# Patient Record
Sex: FEMALE | HISPANIC OR LATINO | Employment: OTHER | URBAN - METROPOLITAN AREA
[De-identification: names, ages, dates, MRNs, and addresses within clinical notes are randomized per-mention and may not be internally consistent; named-entity substitution may affect disease eponyms.]

---

## 2023-04-27 ENCOUNTER — LAB REQUISITION (OUTPATIENT)
Dept: LAB | Facility: CLINIC | Age: 68
End: 2023-04-27
Payer: MEDICAID

## 2023-04-27 DIAGNOSIS — I25.10 ATHEROSCLEROTIC HEART DISEASE OF NATIVE CORONARY ARTERY WITHOUT ANGINA PECTORIS: ICD-10-CM

## 2023-04-27 PROCEDURE — 80061 LIPID PANEL: CPT | Performed by: INTERNAL MEDICINE

## 2023-04-28 LAB
CHOLEST SERPL-MCNC: 153 MG/DL
HDLC SERPL-MCNC: 43 MG/DL
LDLC SERPL CALC-MCNC: 66 MG/DL
NONHDLC SERPL-MCNC: 110 MG/DL
TRIGL SERPL-MCNC: 218 MG/DL

## 2023-05-23 ENCOUNTER — MEDICAL CORRESPONDENCE (OUTPATIENT)
Dept: HEALTH INFORMATION MANAGEMENT | Facility: CLINIC | Age: 68
End: 2023-05-23

## 2023-05-24 ENCOUNTER — TRANSCRIBE ORDERS (OUTPATIENT)
Dept: OTHER | Age: 68
End: 2023-05-24

## 2023-05-24 DIAGNOSIS — G89.29 BILATERAL CHRONIC KNEE PAIN: Primary | ICD-10-CM

## 2023-05-24 DIAGNOSIS — M25.561 BILATERAL CHRONIC KNEE PAIN: Primary | ICD-10-CM

## 2023-05-24 DIAGNOSIS — M25.562 BILATERAL CHRONIC KNEE PAIN: Primary | ICD-10-CM

## 2023-05-25 ENCOUNTER — TRANSCRIBE ORDERS (OUTPATIENT)
Dept: OTHER | Age: 68
End: 2023-05-25

## 2023-05-25 DIAGNOSIS — I25.10 CORONARY ARTERY DISEASE: Primary | ICD-10-CM

## 2023-07-06 NOTE — TELEPHONE ENCOUNTER
DIAGNOSIS: B\L knee pain, had B\L TKA over 10 years ago\ patient went to see Sasha Henry / [facility]\   APPOINTMENT DATE: 7/7/23   NOTES STATUS DETAILS   OFFICE NOTE from referring provider In process    OFFICE NOTE from other specialist In process    OPERATIVE REPORT In process    MEDICATION LIST In process    MRI In process    XRAYS (IMAGES & REPORTS) In process        Records Requested     July 6, 2023 5:16 PM  [place] [address]   Facility  [facility]  Fax: [phone]  [facility]  Fax: [phone]   Outcome CSS faxed urgent request to [facility] for records.     UPDATE 7/7/23 9:08AM - [facility] called [facility] for records. Med Recs stated they will fax records over ASAP.

## 2023-07-07 ENCOUNTER — PRE VISIT (OUTPATIENT)
Dept: ORTHOPEDICS | Facility: CLINIC | Age: 68
End: 2023-07-07

## 2023-07-07 ENCOUNTER — ANCILLARY PROCEDURE (OUTPATIENT)
Dept: GENERAL RADIOLOGY | Facility: CLINIC | Age: 68
End: 2023-07-07
Attending: FAMILY MEDICINE
Payer: COMMERCIAL

## 2023-07-07 ENCOUNTER — OFFICE VISIT (OUTPATIENT)
Dept: ORTHOPEDICS | Facility: CLINIC | Age: 68
End: 2023-07-07
Payer: COMMERCIAL

## 2023-07-07 VITALS — BODY MASS INDEX: 45.53 KG/M2 | HEIGHT: 60 IN | WEIGHT: 231.9 LBS

## 2023-07-07 DIAGNOSIS — M25.551 RIGHT HIP PAIN: ICD-10-CM

## 2023-07-07 DIAGNOSIS — E66.01 MORBID OBESITY (H): ICD-10-CM

## 2023-07-07 DIAGNOSIS — M25.561 BILATERAL KNEE PAIN: ICD-10-CM

## 2023-07-07 DIAGNOSIS — Z96.653 S/P TOTAL KNEE ARTHROPLASTY, BILATERAL: Primary | ICD-10-CM

## 2023-07-07 DIAGNOSIS — M25.562 BILATERAL KNEE PAIN: ICD-10-CM

## 2023-07-07 DIAGNOSIS — G89.29 CHRONIC PAIN OF BOTH KNEES: ICD-10-CM

## 2023-07-07 DIAGNOSIS — M25.561 CHRONIC PAIN OF BOTH KNEES: ICD-10-CM

## 2023-07-07 DIAGNOSIS — M25.562 CHRONIC PAIN OF BOTH KNEES: ICD-10-CM

## 2023-07-07 PROCEDURE — 73562 X-RAY EXAM OF KNEE 3: CPT | Mod: LT | Performed by: RADIOLOGY

## 2023-07-07 PROCEDURE — 99204 OFFICE O/P NEW MOD 45 MIN: CPT | Performed by: FAMILY MEDICINE

## 2023-07-07 PROCEDURE — 73502 X-RAY EXAM HIP UNI 2-3 VIEWS: CPT | Mod: RT | Performed by: RADIOLOGY

## 2023-07-07 RX ORDER — IRBESARTAN 150 MG/1
150 TABLET ORAL AT BEDTIME
COMMUNITY

## 2023-07-07 RX ORDER — ASPIRIN 81 MG/1
81 TABLET ORAL DAILY
COMMUNITY

## 2023-07-07 RX ORDER — CLOPIDOGREL BISULFATE 75 MG/1
75 TABLET ORAL DAILY
COMMUNITY

## 2023-07-07 RX ORDER — ATORVASTATIN CALCIUM 10 MG/1
10 TABLET, FILM COATED ORAL DAILY
COMMUNITY

## 2023-07-07 NOTE — LETTER
7/7/2023      RE: Mila Suarez  [address]  [place] [id number]     Dear Colleague,    Thank you for referring your patient, Mila Suarez, to the [facility] [place]. Please see a copy of my visit note below.    ASSESSMENT/PLAN:    69 yo female with PMhx of bilateral knee TKAs in [facility], obesity presenting for bilateral knee pain and right hip pain  1.  Bilateral knee TKA- valgus alignment, alignment is wrong  Components might be different or completely worn  Weight might be contributer  Left is worse than right  Discussed with Radiology 5 min  2.  Right hip pain- OA greater on right  Pool therapy  OTC meds  Continue to use her walker    RTC to see TKA surgeon  No outside records from past surgeries are available    Pt is a 68 year old female here today for:     S/p bilateral knee TKAs in [facility] about 15 years ago  No past surgical information available, here on refugee status  Pt reports medial and lateral knee pain.  American surgeon that came down, one surgeon only did 100 surgeries before her operation.  Low back pain when she tries to get up from laying down  Right groin/pelvic pain  Always using her walker to help  Just a cane in [facility], here for just 3 months    HPI:   bilateral knee:  Location: anterolateral knee   Duration: 10 years   Trauma/ Fall? No   Able to walk? Yes  Swelling? None   Bruising? None  Numbness/ Tingling? None   Weakness? None   Instability? None  Snapping/Clicking? None  Imaging? XR today   Treatment? None     EXAM:   bilateral Knee:   ROM: 0-130; Crepitus: none  Effusion: mild ; Swelling: swelling   Strength: Full in flexion/ extension   Tenderness: Patella - mild Medial joint line - +++; Lateral joint line - +++; Quad tendon - none; Patellar tendon- none; Hamstring - none.   Cruciates: anterior drawer - not performed  /posterior drawer -not performed. Lachman - not performed    Collaterals: varus -neg/valgus -neg.   Patella: patellar compression - neg; single leg bend- unable  Meniscus: Timmy - painful; Thessaly - not performed  Maneuvers: Yumiko - not performed      No past medical history on file.   No past surgical history on file.   No current outpatient medications on file.      Not on File   ROS:   Gen- no fevers/chills   Rheum - no morning stiffness   Derm - no rash/ redness   Neuro - no numbness, no tingling   Remainder of ROS negative.     Exam:   There were no vitals taken for this visit.     Xray of bilateral knees, right hip/pelvis on July 7, 2023 at [facility] location - films personally reviewed with patient at time of visit     My impression: bilateral knee TKA- components are an issue, right hip OA         Again, thank you for allowing me to participate in the care of your patient.      Sincerely,    Kenna Pearce MD

## 2023-07-07 NOTE — PROGRESS NOTES
ASSESSMENT/PLAN:    67 yo female with PMhx of bilateral knee TKAs in [facility], obesity presenting for bilateral knee pain and right hip pain  1.  Bilateral knee TKA- valgus alignment, alignment is wrong  Components might be different or completely worn  Weight might be contributer  Left is worse than right  Discussed with Radiology 5 min  2.  Right hip pain- OA greater on right  Pool therapy  OTC meds  Continue to use her walker    RTC to see TKA surgeon  No outside records from past surgeries are available    Pt is a 68 year old female here today for:     S/p bilateral knee TKAs in [facility] about 15 years ago  No past surgical information available, here on refugee status  Pt reports medial and lateral knee pain.  American surgeon that came down, one surgeon only did 100 surgeries before her operation.  Low back pain when she tries to get up from laying down  Right groin/pelvic pain  Always using her walker to help  Just a cane in [facility], here for just 3 months    HPI:   bilateral knee:  Location: anterolateral knee   Duration: 10 years   Trauma/ Fall? No   Able to walk? Yes  Swelling? None   Bruising? None  Numbness/ Tingling? None   Weakness? None   Instability? None  Snapping/Clicking? None  Imaging? XR today   Treatment? None     EXAM:   bilateral Knee:   ROM: 0-130; Crepitus: none  Effusion: mild ; Swelling: swelling   Strength: Full in flexion/ extension   Tenderness: Patella - mild Medial joint line - +++; Lateral joint line - +++; Quad tendon - none; Patellar tendon- none; Hamstring - none.   Cruciates: anterior drawer - not performed  /posterior drawer -not performed. Lachman - not performed   Collaterals: varus -neg/valgus -neg.   Patella: patellar compression - neg; single leg bend- unable  Meniscus: Timmy - painful; Thessaly - not performed  Maneuvers: Yumiko - not performed      No past medical history on file.   No past surgical history on file.   No current outpatient medications on file.       Not on File   ROS:   Gen- no fevers/chills   Rheum - no morning stiffness   Derm - no rash/ redness   Neuro - no numbness, no tingling   Remainder of ROS negative.     Exam:   There were no vitals taken for this visit.     Xray of bilateral knees, right hip/pelvis on July 7, 2023 at [facility] location - films personally reviewed with patient at time of visit     My impression: bilateral knee TKA- components are an issue, right hip OA

## 2023-07-07 NOTE — TELEPHONE ENCOUNTER
DIAGNOSIS: B\L knee pain, had B\L TKA over 10 years ago\ patient went to see Sasha Henry / [facility]\   APPOINTMENT DATE: 7/7/23   NOTES STATUS DETAILS   OFFICE NOTE from referring provider Care Everywhere 6/27/23, 5/23/23, 5/16/23 - Kathie Henry NP - [facility] - BL knee pain    OFFICE NOTE from other specialist In process/ Care Everywhere 4/27/23 - Denise Naik MD - [facility]    OPERATIVE REPORT In process [facility]?   MEDICATION LIST Care Everywhere        Records Requested     July 6, 2023 5:16 PM  [id number]   Facility  [facility]  Fax: [phone]  [facility]  Fax: [phone]   Outcome CSS faxed urgent request to [facility] for records.     UPDATE 7/7/23 9:08AM - [facility] called [facility] for records. Med Recs stated they will fax records over ASA.     RECORDS RECEIVED: Patient was scheduled incorrectly, new chart was made and pt name was spelled wrong. [facility] had CC reschedule appt to correct patient chart and guillermina for merge. Clinic updated.     [facility] gave ERICH to Christof[facility] for outside records from [facility]

## 2023-07-14 ENCOUNTER — HOSPITAL ENCOUNTER (OUTPATIENT)
Dept: CARDIOLOGY | Facility: CLINIC | Age: 68
Discharge: HOME | End: 2023-07-14
Payer: COMMERCIAL

## 2023-07-14 DIAGNOSIS — I25.10 CORONARY ARTERY DISEASE: ICD-10-CM

## 2023-07-14 LAB — LVEF ECHO: NORMAL

## 2023-07-14 PROCEDURE — 93306 TTE W/DOPPLER COMPLETE: CPT | Mod: 26 | Performed by: INTERNAL MEDICINE

## 2023-07-14 PROCEDURE — 255N000002 HC RX 255 OP 636: Performed by: INTERNAL MEDICINE

## 2023-07-14 RX ADMIN — HUMAN ALBUMIN MICROSPHERES AND PERFLUTREN 5 ML: 10; .22 INJECTION, SOLUTION INTRAVENOUS at 14:17

## 2023-08-04 NOTE — TELEPHONE ENCOUNTER
DIAGNOSIS: Bilateral knee TKA in [facility] 15 years ago, increasing pain over the last 10 years / XR / Kenna Pearce MD / [facility]    APPOINTMENT DATE: 08/09/23   NOTES STATUS DETAILS   OFFICE NOTE from referring provider Internal/Care Everywhere 07/07/23- Kenna Pearce MD    6/27/23, 5/23/23, 5/16/23 - Kathie Henry NP - [facility] -  knee pain    OFFICE NOTE from other specialist Care Everywhere 4/27/23 - Denise Naik MD - [facility]     MEDICATION LIST Internal    XRAYS (IMAGES & REPORTS) Internal 07/07/23- Kenna Pearce MD:  - XR NANCY Knee

## 2023-08-09 ENCOUNTER — PRE VISIT (OUTPATIENT)
Dept: ORTHOPEDICS | Facility: CLINIC | Age: 68
End: 2023-08-09

## 2023-08-25 ENCOUNTER — MEDICAL CORRESPONDENCE (OUTPATIENT)
Dept: HEALTH INFORMATION MANAGEMENT | Facility: CLINIC | Age: 68
End: 2023-08-25
Payer: COMMERCIAL

## 2023-08-28 ENCOUNTER — TRANSCRIBE ORDERS (OUTPATIENT)
Dept: OTHER | Age: 68
End: 2023-08-28

## 2023-08-28 DIAGNOSIS — I10 PRIMARY HYPERTENSION: Primary | ICD-10-CM

## 2023-09-01 NOTE — TELEPHONE ENCOUNTER
DIAGNOSIS: Bilateral knee TKA in [facility] 15 years ago, increasing pain over the last 10 years    avail over the phone at [phone] option 1 or via iPad.    APPOINTMENT DATE: 09/07/2023   NOTES STATUS DETAILS   OFFICE NOTE from referring provider Internal 07/07/2023 - Kenna Pearce MD - [facility]   OPERATIVE REPORT N/A    MEDICATION LIST Internal    XRAYS (IMAGES & REPORTS) Internal 07/07/2023 - Hip/Pelvis  07/07/2023 - Bilateral knee

## 2023-09-07 ENCOUNTER — PRE VISIT (OUTPATIENT)
Dept: ORTHOPEDICS | Facility: CLINIC | Age: 68
End: 2023-09-07

## 2023-09-07 ENCOUNTER — OFFICE VISIT (OUTPATIENT)
Dept: ORTHOPEDICS | Facility: CLINIC | Age: 68
End: 2023-09-07
Attending: FAMILY MEDICINE
Payer: COMMERCIAL

## 2023-09-07 ENCOUNTER — ANCILLARY PROCEDURE (OUTPATIENT)
Dept: GENERAL RADIOLOGY | Facility: CLINIC | Age: 68
End: 2023-09-07
Attending: ORTHOPAEDIC SURGERY
Payer: COMMERCIAL

## 2023-09-07 VITALS — HEIGHT: 59 IN | WEIGHT: 231 LBS | BODY MASS INDEX: 46.57 KG/M2

## 2023-09-07 DIAGNOSIS — M25.561 CHRONIC PAIN OF BOTH KNEES: ICD-10-CM

## 2023-09-07 DIAGNOSIS — G89.29 CHRONIC PAIN OF BOTH KNEES: ICD-10-CM

## 2023-09-07 DIAGNOSIS — Z96.653 S/P TOTAL KNEE ARTHROPLASTY, BILATERAL: ICD-10-CM

## 2023-09-07 DIAGNOSIS — M25.562 CHRONIC PAIN OF BOTH KNEES: ICD-10-CM

## 2023-09-07 PROCEDURE — 77073 BONE LENGTH STUDIES: CPT | Performed by: STUDENT IN AN ORGANIZED HEALTH CARE EDUCATION/TRAINING PROGRAM

## 2023-09-07 PROCEDURE — 99204 OFFICE O/P NEW MOD 45 MIN: CPT | Mod: GC | Performed by: ORTHOPAEDIC SURGERY

## 2023-09-07 NOTE — LETTER
9/7/2023         RE: Mila Aguirre  [address]  [place] [phone]        Dear Colleague,    Thank you for referring your patient, Mila Aguirre, to the [facility] [place]. Please see a copy of my visit note below.    I have personally examined this patient and have reviewed the clinical presentation and progress note with the resident. I agree with the treatment plan as outlined. The plan was formulated with the resident on the day of the resident's dictation.  Implants are Sharron and discussed the poly options with the rep today       Assessment: This is a 68 year old with a history of bilateral total knee arthroplasties performed in [facility] approximately 15 years ago, now presenting with left greater than right bilateral knee pain, with radiographic evidence of medial sided wear of the polyethylene insert bilaterally.  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    Plan:    Discussed the concerns of the alignment of the knee arthroplasty as well as a limb with the patient, as well as the pain she is experiencing.  Discussed this will likely require a revision surgery of her left total knee arthroplasty, however prior to planning for the revision, we will obtain a CT of the left lower extremity to evaluate component fixation, as well as overall alignment.  The patient will follow-up for surgical discussion after the CT is complete.    Patient seen and discussed with Dr. Burkett.      Chief Complaint: Consult (Bilateral TKA in [facility] 15 years ago. Pain increasing over the past ten years // )      Physician:  Kenna Alas*    A Welsh video  was utilized for this visit.     HPI: Mila Aguirre is a 68 year old female who presents today for evaluation of bilateral knee pain, left worse than right.  She reports that she had her knee replaced since done in   [facility] approximately 15 years ago.  She felt that she was doing well for a few years postoperatively, however began to have bilateral knee pain, starting with the left and then progressing to the right approximately 6 years after her knee replacements.  Additionally at this time she started to notice a difference in her gait pattern on her left side.  Unfortunately she has also noticed instability in her left knee.  Her knee pain has progressed to the point that it is difficult for her to do stairs or any prolonged walking or activities.  She reports that she mostly stays at home at this time in order to help alleviate her pain.  She periodically takes over-the-counter pain medications.  She denies a history of infections or other issues with her knees.  She had no further surgeries performed on her knees.    She recently moved to [place] from [facility], within the last 4 months.  She did see a primary care provider approximately 3 months ago.    FANNY Santa  PROMIS Mental: (P) 13  PROMIS Physical (P) 11  PROMIS TOTAL (P) 24  [facility]:    MEDICAL HISTORY: No past medical history on file.    Medications:     Current Outpatient Medications:     aspirin 81 MG EC tablet, Take 81 mg by mouth daily, Disp: , Rfl:     atorvastatin (LIPITOR) 10 MG tablet, Take 10 mg by mouth daily, Disp: , Rfl:     clopidogrel (PLAVIX) 75 MG tablet, Take 75 mg by mouth daily, Disp: , Rfl:     irbesartan (AVAPRO) 150 MG tablet, Take 150 mg by mouth At Bedtime, Disp: , Rfl:     Allergies: Patient has no known allergies.    SURGICAL HISTORY: No past surgical history on file.    FAMILY HISTORY: No family history on file.    SOCIAL HISTORY:   Social History     Tobacco Use    Smoking status: Not on file    Smokeless tobacco: Not on file   Substance Use Topics    Alcohol use: Not on file       REVIEW OF SYSTEMS:  The comprehensive review of systems from the intake form was reviewed with the patient.  No fever, weight change or fatigue. No dry eyes.  "No oral ulcers, sore throat or voice change. No palpitations, syncope, angina or edema.  No chest pain, excessive sleepiness, shortness of breath or hemoptysis.   No abdominal pain, nausea, vomiting, diarrhea or heartburn.  No skin rash. No focal weakness or numbness. No bleeding or lymphadenopathy. No rhinitis or hives.     Exam:  On physical examination the patient appears the stated age, is in no acute distress, affect is appropriate, and breathing is non-labored.  Vitals are documented in the EMR and have been reviewed:    Ht 1.511 m (4' 11.49\")   Wt 104.8 kg (231 lb)   BMI 45.89 kg/m    4' 11.488\"  Body mass index is 45.89 kg/m .    Rises from chair: Independently though slowly and cautiously  Gait: Valgus thrust gait on the left  Gains the exam table: Did not perform    Bilateral lower extremity with chronic venous stasis changes.     Right  Knee  Appearance: Well-healed surgical incision  Clinical alignment: Varus alignment, corrects to neutral  Effusion: moderate effusion   Tenderness to palpation: Minimally ttp about the join  Extension: Full extension   Flexion: 120 deg   Collateral ligaments: intact collateral ligaments with firm end point on varus stress   Cruciate ligaments: grossly intact     Left Knee  Appearance: Well-healed surgical incision  Clinical alignment: Varus alignment, corrects to neutral  Effusion: moderate effusion   Tenderness to palpation: nontender to palpation   Extension: Full extension   Flexion: 120 deg   Collateral ligaments: Laxity with varus stress without firm end point, concerning for LCL laxity/injury   Cruciate ligaments: grossly intact     Distally, the circulatory, motor, and sensation exam is intact with 5/5 EHL, gastroc-soleus, and tibialis anterior.    Sensation to light touch is intact.    Dorsalis pedis and posterior tibialis pulses are palpable.    There are no sores on the feet, no bruising, and no lymphedema.    X-rays:   Bilateral knee radiographs and 6 foot " standing radiographs reviewed. Left knee demonstrates significant medial-sided poly wear, components appear well fixed. Right knee with medial sided wear of the polyethylene component as well, though again components remained well fixed. Limb alignment radiographs demonstrate a varus alignment.       Sincerely,        J Carlos Burkett MD

## 2023-09-07 NOTE — NURSING NOTE
"Reason For Visit:   Chief Complaint   Patient presents with    Consult     Bilateral TKA in [facility] 15 years ago. Pain increasing over the past ten years //        Ht 1.511 m (4' 11.49\")   Wt 104.8 kg (231 lb)   BMI 45.89 kg/m      Pain Assessment  Patient Currently in Pain: Yes  0-10 Pain Scale: 8 (pain with walking L>R pain and inflamation)    Lauri Roberts, EMT    "

## 2023-09-07 NOTE — PROGRESS NOTES
Assessment: This is a 68 year old with a history of bilateral total knee arthroplasties performed in [facility] approximately 15 years ago, now presenting with left greater than right bilateral knee pain, with radiographic evidence of medial sided wear of the polyethylene insert bilaterally.  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    Plan:    Discussed the concerns of the alignment of the knee arthroplasty as well as a limb with the patient, as well as the pain she is experiencing.  Discussed this will likely require a revision surgery of her left total knee arthroplasty, however prior to planning for the revision, we will obtain a CT of the left lower extremity to evaluate component fixation, as well as overall alignment.  The patient will follow-up for surgical discussion after the CT is complete.    Patient seen and discussed with Dr. Burkett.      Chief Complaint: Consult (Bilateral TKA in [facility] 15 years ago. Pain increasing over the past ten years // )      Physician:  Kenna Alas*    A Irish video  was utilized for this visit.     HPI: Mila Aguirre is a 68 year old female who presents today for evaluation of bilateral knee pain, left worse than right.  She reports that she had her knee replaced since done in [facility] approximately 15 years ago.  She felt that she was doing well for a few years postoperatively, however began to have bilateral knee pain, starting with the left and then progressing to the right approximately 6 years after her knee replacements.  Additionally at this time she started to notice a difference in her gait pattern on her left side.  Unfortunately she has also noticed instability in her left knee.  Her knee pain has progressed to the point that it is difficult for her to do stairs or any prolonged walking or activities.  She reports that she mostly stays at home at this time in order to help alleviate her pain.  " She periodically takes over-the-counter pain medications.  She denies a history of infections or other issues with her knees.  She had no further surgeries performed on her knees.    She recently moved to [place] from [facility], within the last 4 months.  She did see a primary care provider approximately 3 months ago.    FANNY Santa  PROMIS Mental: (P) 13  PROMIS Physical (P) 11  PROMIS TOTAL (P) 24  [facility]:    MEDICAL HISTORY: No past medical history on file.    Medications:     Current Outpatient Medications:     aspirin 81 MG EC tablet, Take 81 mg by mouth daily, Disp: , Rfl:     atorvastatin (LIPITOR) 10 MG tablet, Take 10 mg by mouth daily, Disp: , Rfl:     clopidogrel (PLAVIX) 75 MG tablet, Take 75 mg by mouth daily, Disp: , Rfl:     irbesartan (AVAPRO) 150 MG tablet, Take 150 mg by mouth At Bedtime, Disp: , Rfl:     Allergies: Patient has no known allergies.    SURGICAL HISTORY: No past surgical history on file.    FAMILY HISTORY: No family history on file.    SOCIAL HISTORY:   Social History     Tobacco Use    Smoking status: Not on file    Smokeless tobacco: Not on file   Substance Use Topics    Alcohol use: Not on file       REVIEW OF SYSTEMS:  The comprehensive review of systems from the intake form was reviewed with the patient.  No fever, weight change or fatigue. No dry eyes. No oral ulcers, sore throat or voice change. No palpitations, syncope, angina or edema.  No chest pain, excessive sleepiness, shortness of breath or hemoptysis.   No abdominal pain, nausea, vomiting, diarrhea or heartburn.  No skin rash. No focal weakness or numbness. No bleeding or lymphadenopathy. No rhinitis or hives.     Exam:  On physical examination the patient appears the stated age, is in no acute distress, affect is appropriate, and breathing is non-labored.  Vitals are documented in the EMR and have been reviewed:    Ht 1.511 m (4' 11.49\")   Wt 104.8 kg (231 lb)   BMI 45.89 kg/m    4' 11.488\"  Body mass index is 45.89 " kg/m .    Rises from chair: Independently though slowly and cautiously  Gait: Valgus thrust gait on the left  Gains the exam table: Did not perform    Bilateral lower extremity with chronic venous stasis changes.     Right  Knee  Appearance: Well-healed surgical incision  Clinical alignment: Varus alignment, corrects to neutral  Effusion: moderate effusion   Tenderness to palpation: Minimally ttp about the join  Extension: Full extension   Flexion: 120 deg   Collateral ligaments: intact collateral ligaments with firm end point on varus stress   Cruciate ligaments: grossly intact     Left Knee  Appearance: Well-healed surgical incision  Clinical alignment: Varus alignment, corrects to neutral  Effusion: moderate effusion   Tenderness to palpation: nontender to palpation   Extension: Full extension   Flexion: 120 deg   Collateral ligaments: Laxity with varus stress without firm end point, concerning for LCL laxity/injury   Cruciate ligaments: grossly intact     Distally, the circulatory, motor, and sensation exam is intact with 5/5 EHL, gastroc-soleus, and tibialis anterior.    Sensation to light touch is intact.    Dorsalis pedis and posterior tibialis pulses are palpable.    There are no sores on the feet, no bruising, and no lymphedema.    X-rays:   Bilateral knee radiographs and 6 foot standing radiographs reviewed. Left knee demonstrates significant medial-sided poly wear, components appear well fixed. Right knee with medial sided wear of the polyethylene component as well, though again components remained well fixed. Limb alignment radiographs demonstrate a varus alignment.

## 2023-09-08 ENCOUNTER — ANCILLARY PROCEDURE (OUTPATIENT)
Dept: CT IMAGING | Facility: CLINIC | Age: 68
End: 2023-09-08
Attending: ORTHOPAEDIC SURGERY
Payer: COMMERCIAL

## 2023-09-08 DIAGNOSIS — M25.562 CHRONIC PAIN OF BOTH KNEES: ICD-10-CM

## 2023-09-08 DIAGNOSIS — M25.561 CHRONIC PAIN OF BOTH KNEES: ICD-10-CM

## 2023-09-08 DIAGNOSIS — G89.29 CHRONIC PAIN OF BOTH KNEES: ICD-10-CM

## 2023-09-08 DIAGNOSIS — Z96.653 S/P TOTAL KNEE ARTHROPLASTY, BILATERAL: ICD-10-CM

## 2023-09-08 PROCEDURE — 73700 CT LOWER EXTREMITY W/O DYE: CPT | Mod: LT | Performed by: RADIOLOGY

## 2023-09-26 ENCOUNTER — OFFICE VISIT (OUTPATIENT)
Dept: ORTHOPEDICS | Facility: CLINIC | Age: 68
End: 2023-09-26
Payer: COMMERCIAL

## 2023-09-26 VITALS — BODY MASS INDEX: 45.6 KG/M2 | WEIGHT: 229.5 LBS

## 2023-09-26 DIAGNOSIS — E66.9 OBESITY (BMI 30-39.9): ICD-10-CM

## 2023-09-26 DIAGNOSIS — Z96.653 S/P TOTAL KNEE ARTHROPLASTY, BILATERAL: Primary | ICD-10-CM

## 2023-09-26 PROCEDURE — 99213 OFFICE O/P EST LOW 20 MIN: CPT | Performed by: ORTHOPAEDIC SURGERY

## 2023-09-26 ASSESSMENT — PAIN SCALES - GENERAL: PAINLEVEL: EXTREME PAIN (8)

## 2023-09-26 NOTE — NURSING NOTE
Mila Aguirre's chief complaint for this visit includes:  Chief Complaint   Patient presents with    RECHECK     Discuss CT results 9/8/23       Referring Provider:  No referring provider defined for this encounter.    There were no vitals taken for this visit.  Extreme Pain (8)   Global Mental Health Score:    Global Physical Health Score:    PROMIS TOTAL - SUBSCORES:    [facility]:  KOOSJR: 36.93       Previous surgeries: BL TKA ~ 15yrs prior.   Previous injections within last 6 months: NO  Imaging completed: CT 9/8/23  Concerns: None at this time, just discussing next steps after CT results.     Morales Toledo, ATC

## 2023-09-26 NOTE — LETTER
9/26/2023         RE: Mila Aguirre  [address]  [place] [address]        Dear Colleague,    Thank you for referring your patient, Mila Aguirre, to the [facility]. Please see a copy of my visit note below.    Chief Complaint: RECHECK (Discuss CT results 9/8/23)       HPI: Mila Aguirre returns today in follow-up for her knee. We obtained a CT to evaluate the implants. There does not appear to be implant loosening or bone loss but there is clear poly wear.       Medications and allergies are documented in the EMR and have been reviewed.    Current Outpatient Medications:      aspirin 81 MG EC tablet, Take 81 mg by mouth daily, Disp: , Rfl:      atorvastatin (LIPITOR) 10 MG tablet, Take 10 mg by mouth daily, Disp: , Rfl:      clopidogrel (PLAVIX) 75 MG tablet, Take 75 mg by mouth daily, Disp: , Rfl:      irbesartan (AVAPRO) 150 MG tablet, Take 150 mg by mouth At Bedtime, Disp: , Rfl:   Allergies: Patient has no known allergies.    Physical Exam:  On physical examination the patient appears the stated age, is in no acute distress, affect is appropriate, and breathing is non-labored.  There were no vitals taken for this visit.  There is no height or weight on file to calculate BMI.  Gait:     Appearance: benign  Clinical alignment: neutral  Effusion:  Tenderness to palpation:  Extension:  Flexion:   Patellar tracking: normal   Collateral ligaments: intact  Stable in in the sagittal plane in mid-flexion.    Distally, the circulatory, motor, and sensation exam is intact with 5/5 EHL, gastroc-soleus, and tibialis anterior.  Sensation to light touch is intact.  Dorsalis pedis and posterior tibialis pulses are palpable.  There are no sores on the feet, no bruising, and no lymphedema.    X-rays:    Order  CT Femur Thigh Left w/o Contrast [[id number]] (Order [id number])  Exam Information    Exam Date Exam Time Accession #  Performing Department Results    9/8/23 12:49 PM [id number] [facility]  Services      PACS Images     Show images for CT Femur Thigh Left w/o Contrast     Study Result    Narrative & Impression   EXAM: CT KNEE LEFT W/O CONTRAST, CT FEMUR THIGH LEFT W/O CONTRAST, CT  TIBIA FIBULA LOWER LEG LEFT WO CONTRAST  9/8/2023 12:49 PM       HISTORY: Chronic pain of both knees; Chronic pain of both knees;  Chronic pain of both knees; S/P total knee arthroplasty, bilateral     COMPARISON: Radiographs 9/7/2023, 7/7/2023     TECHNIQUE: CT imaging of the left femur, left knee, and left leg  without IV contrast. Multiplanar reconstructions.     FINDINGS:       images: Total bilateral knee arthroplasties.     No acute osseous abnormality.     Total left knee arthroplasty. Subtle asymmetric narrowing of the  medial compartment are appreciated on recent weight bearing  radiographs. No substantial osteolysis. Small moderate joint effusion.  Baker's cyst measuring up to 8 cm craniocaudal.     Moderate degenerative changes of the left hip.     Calcaneal enthesopathy. Cystic changes in the posterior tibial  plafond. Calcaneocuboid joint degenerative change.     Fatty infiltration of the proximal tensor fascia on the and gluteus  minimus. Vascular calcifications.                                                                        IMPRESSION:      Total left knee arthroplasty with asymmetric medial compartment  narrowing, better appreciated on recent weight bearing radiographs.     TEMI CHAHAL MD (Joe)         SYSTEM ID:  [id number]       Assessment: Eccentric poly wear with questions o failure of the lateral collateral ligament. We discussed if so that need for revision of the implants.   Plan: prep for procedure including weight loss. RTC as she closes in on goal  stressed importance of follow-unit(s)[     Twenty minutes were spent with the patient with  greater than 50% on counseling and coordination of care.     No ref. provider found      Again, thank you for allowing me to participate in the care of your patient.        Sincerely,        J Carlos Burkett MD

## 2023-09-26 NOTE — PROGRESS NOTES
Chief Complaint: RECHECK (Discuss CT results 9/8/23)       HPI: Mila Aguirre returns today in follow-up for her knee. We obtained a CT to evaluate the implants. There does not appear to be implant loosening or bone loss but there is clear poly wear.       Medications and allergies are documented in the EMR and have been reviewed.    Current Outpatient Medications:     aspirin 81 MG EC tablet, Take 81 mg by mouth daily, Disp: , Rfl:     atorvastatin (LIPITOR) 10 MG tablet, Take 10 mg by mouth daily, Disp: , Rfl:     clopidogrel (PLAVIX) 75 MG tablet, Take 75 mg by mouth daily, Disp: , Rfl:     irbesartan (AVAPRO) 150 MG tablet, Take 150 mg by mouth At Bedtime, Disp: , Rfl:   Allergies: Patient has no known allergies.    Physical Exam:  On physical examination the patient appears the stated age, is in no acute distress, affect is appropriate, and breathing is non-labored.  There were no vitals taken for this visit.  There is no height or weight on file to calculate BMI.  Gait:     Appearance: benign  Clinical alignment: neutral  Effusion:  Tenderness to palpation:  Extension:  Flexion:   Patellar tracking: normal   Collateral ligaments: intact  Stable in in the sagittal plane in mid-flexion.    Distally, the circulatory, motor, and sensation exam is intact with 5/5 EHL, gastroc-soleus, and tibialis anterior.  Sensation to light touch is intact.  Dorsalis pedis and posterior tibialis pulses are palpable.  There are no sores on the feet, no bruising, and no lymphedema.    X-rays:    Order  CT Femur Thigh Left w/o Contrast [[id number]] (Order [id number])  Exam Information    Exam Date Exam Time Accession # Performing Department Results    9/8/23 12:49 PM [id number] [facility]  Services      PACS Images     Show images for CT Femur Thigh Left w/o Contrast     Study Result    Narrative & Impression   EXAM: CT KNEE LEFT W/O CONTRAST, CT  FEMUR THIGH LEFT W/O CONTRAST, CT  TIBIA FIBULA LOWER LEG LEFT WO CONTRAST  9/8/2023 12:49 PM       HISTORY: Chronic pain of both knees; Chronic pain of both knees;  Chronic pain of both knees; S/P total knee arthroplasty, bilateral     COMPARISON: Radiographs 9/7/2023, 7/7/2023     TECHNIQUE: CT imaging of the left femur, left knee, and left leg  without IV contrast. Multiplanar reconstructions.     FINDINGS:       images: Total bilateral knee arthroplasties.     No acute osseous abnormality.     Total left knee arthroplasty. Subtle asymmetric narrowing of the  medial compartment are appreciated on recent weight bearing  radiographs. No substantial osteolysis. Small moderate joint effusion.  Baker's cyst measuring up to 8 cm craniocaudal.     Moderate degenerative changes of the left hip.     Calcaneal enthesopathy. Cystic changes in the posterior tibial  plafond. Calcaneocuboid joint degenerative change.     Fatty infiltration of the proximal tensor fascia on the and gluteus  minimus. Vascular calcifications.                                                                        IMPRESSION:      Total left knee arthroplasty with asymmetric medial compartment  narrowing, better appreciated on recent weight bearing radiographs.     TEMI CHAHAL MD (Joe)         SYSTEM ID:  [id number]       Assessment: Eccentric poly wear with questions o failure of the lateral collateral ligament. We discussed if so that need for revision of the implants.   Plan: prep for procedure including weight loss. RTC as she closes in on goal  stressed importance of follow-unit(s)[     Twenty minutes were spent with the patient with greater than 50% on counseling and coordination of care.     No ref. provider found

## 2023-09-26 NOTE — PATIENT INSTRUCTIONS
Thanks for coming today.  Ortho[facility] Medicine Clinic  [address] [place] [address]    To schedule future appointments in Ortho Clinic, you may call [phone].    To schedule ordered imaging or an injection ordered by your provider:  Call Central Imaging Injection scheduling line: [phone]    MyChart available online at:  [email or web address]    Please call if any further questions or concerns ([phone]).  Clinic hours 8 am to 5 pm.    Return to clinic (call) if symptoms worsen or fail to improve.

## 2023-11-03 ENCOUNTER — LAB REQUISITION (OUTPATIENT)
Dept: LAB | Facility: CLINIC | Age: 68
End: 2023-11-03
Payer: COMMERCIAL

## 2023-11-03 DIAGNOSIS — R10.13 EPIGASTRIC PAIN: ICD-10-CM

## 2023-11-03 LAB
ALBUMIN SERPL BCG-MCNC: 4.3 G/DL (ref 3.5–5.2)
ALP SERPL-CCNC: 86 U/L (ref 35–104)
ALT SERPL W P-5'-P-CCNC: 24 U/L (ref 0–50)
ANION GAP SERPL CALCULATED.3IONS-SCNC: 13 MMOL/L (ref 7–15)
AST SERPL W P-5'-P-CCNC: 26 U/L (ref 0–45)
BASOPHILS # BLD AUTO: 0.1 10E3/UL (ref 0–0.2)
BASOPHILS NFR BLD AUTO: 1 %
BILIRUB SERPL-MCNC: 0.4 MG/DL
BUN SERPL-MCNC: 8.1 MG/DL (ref 8–23)
CALCIUM SERPL-MCNC: 9.4 MG/DL (ref 8.8–10.2)
CHLORIDE SERPL-SCNC: 102 MMOL/L (ref 98–107)
CREAT SERPL-MCNC: 0.68 MG/DL (ref 0.51–0.95)
DEPRECATED HCO3 PLAS-SCNC: 25 MMOL/L (ref 22–29)
EGFRCR SERPLBLD CKD-EPI 2021: >90 ML/MIN/1.73M2
EOSINOPHIL # BLD AUTO: 0.2 10E3/UL (ref 0–0.7)
EOSINOPHIL NFR BLD AUTO: 3 %
ERYTHROCYTE [DISTWIDTH] IN BLOOD BY AUTOMATED COUNT: 13.2 % (ref 10–15)
GLUCOSE SERPL-MCNC: 97 MG/DL (ref 70–99)
HCT VFR BLD AUTO: 46.1 % (ref 35–47)
HGB BLD-MCNC: 14.4 G/DL (ref 11.7–15.7)
IMM GRANULOCYTES # BLD: 0 10E3/UL
IMM GRANULOCYTES NFR BLD: 0 %
LIPASE SERPL-CCNC: 21 U/L (ref 13–60)
LYMPHOCYTES # BLD AUTO: 2.1 10E3/UL (ref 0.8–5.3)
LYMPHOCYTES NFR BLD AUTO: 28 %
MCH RBC QN AUTO: 29.2 PG (ref 26.5–33)
MCHC RBC AUTO-ENTMCNC: 31.2 G/DL (ref 31.5–36.5)
MCV RBC AUTO: 94 FL (ref 78–100)
MONOCYTES # BLD AUTO: 0.5 10E3/UL (ref 0–1.3)
MONOCYTES NFR BLD AUTO: 6 %
NEUTROPHILS # BLD AUTO: 4.9 10E3/UL (ref 1.6–8.3)
NEUTROPHILS NFR BLD AUTO: 62 %
NRBC # BLD AUTO: 0 10E3/UL
NRBC BLD AUTO-RTO: 0 /100
PLATELET # BLD AUTO: 277 10E3/UL (ref 150–450)
POTASSIUM SERPL-SCNC: 4.1 MMOL/L (ref 3.4–5.3)
PROT SERPL-MCNC: 7.4 G/DL (ref 6.4–8.3)
RBC # BLD AUTO: 4.93 10E6/UL (ref 3.8–5.2)
SODIUM SERPL-SCNC: 140 MMOL/L (ref 135–145)
WBC # BLD AUTO: 7.8 10E3/UL (ref 4–11)

## 2023-11-03 PROCEDURE — 85025 COMPLETE CBC W/AUTO DIFF WBC: CPT | Mod: ORL | Performed by: INTERNAL MEDICINE

## 2023-11-03 PROCEDURE — 83690 ASSAY OF LIPASE: CPT | Mod: ORL | Performed by: INTERNAL MEDICINE

## 2023-11-03 PROCEDURE — 80053 COMPREHEN METABOLIC PANEL: CPT | Mod: ORL | Performed by: INTERNAL MEDICINE

## 2024-01-11 PROBLEM — E55.9 VITAMIN D DEFICIENCY: Status: ACTIVE | Noted: 2023-05-15

## 2024-01-11 PROBLEM — M25.562 CHRONIC PAIN OF BOTH KNEES: Status: ACTIVE | Noted: 2023-05-05

## 2024-01-11 PROBLEM — M25.561 CHRONIC PAIN OF BOTH KNEES: Status: ACTIVE | Noted: 2023-05-05

## 2024-01-11 PROBLEM — Z22.7 LATENT TUBERCULOSIS BY BLOOD TEST: Status: ACTIVE | Noted: 2023-07-07

## 2024-01-11 PROBLEM — R73.03 PREDIABETES: Status: ACTIVE | Noted: 2023-07-11

## 2024-01-11 PROBLEM — Q24.9 CARDIAC ABNORMALITY: Status: ACTIVE | Noted: 2023-05-05

## 2024-01-11 PROBLEM — I10 PRIMARY HYPERTENSION: Status: ACTIVE | Noted: 2023-05-05

## 2024-01-11 PROBLEM — I25.2 HISTORY OF MI (MYOCARDIAL INFARCTION): Status: ACTIVE | Noted: 2023-05-05

## 2024-01-11 PROBLEM — G89.29 CHRONIC PAIN OF BOTH KNEES: Status: ACTIVE | Noted: 2023-05-05

## 2024-04-17 NOTE — PROGRESS NOTES
"MEDICAL WEIGHT LOSS INITIAL EVALUATION  Patient accompanied by: self/  Dinorah (#[id number]) on speaker phone  DIAGNOSIS:  Class III Obesity    NUTRITION HISTORY:  Breakfast: black coffee + 2 spoons sugar  Lunch: bean soup with rice with vegetables, lettuce salad @ 1 pm  Dinner: coffee + 2 spoons sugar, cookie, fruit @ 7:30-8 pm  Snacks: no  Beverage choices: ~50-70 oz water, coffee + sugar, occasional orange or melon juice, ETOH-no   Dining out: 1X per week after [facility]    Eating Disorder Screen     I binge eat No      I make myself vomit what I have eaten or use laxatives to get rid of food. no   I eat a large amount of food, like a loaf of bread, a box of cookies, a pint/quart of ice cream, all at once. no   I eat a large amount of food even when I am not hungry. no   I eat alone because I feel embarrassed and do not want others to see how much I have eaten. no   I eat until I am uncomfortably full. no   I feel bad, disgusted, or guilty after I overeat. Doesn't really have this - measures portions     Night time eating: no  Emotional eating:no     EXERCISE:  Type: walks to park  Frequency: 2-3 days per week  Duration: 20 minutes    ADDITIONAL INFORMATION: Pateint needs to lose 40 pounds prior to having knee surgery.  Pateint does grocerys shopping and the cooking for: spouse, daughter, Granddaughter. Patient reports her family can help her to read English. Currently not active on [facility].       ANTHROPOMETRICS:  Height: 4'11\"  Weight: 238 lb  BMI:  48.07 kg/m2  NUTRITION DIAGNOSIS:Obese class III related to overeating and poor lifestyle habits as evidence by patient's subjective statements and  BMI of 48.07 kg/m2   NUTRITION INTERVENTIONS  Nutrition Prescription:  Recommend modified energy- nutrient intake  Implementation:  Nutrition Education (Content):  Discussed plate guidelines   Portion sizes  Reviewed healthy snacking and beverage choices  Provided: Tips to Support Weight Loss ( " Taiwanese), Plate Guidelines (Taiwanese), Protein Sources for Weight Loss    Nutrition Education (Application):   Patient to practice goals as stated below  Patient verbalizes understanding of diet by listing food she could eat for breakfast)  Anticipate fair-good compliance    Goals:  Eat breakfast daily (fruit, toast , eggs, leftovers from dinner)  Eat protein at each meal  Increase walking to 3-4X per week      FOLLOW UP AND MONITORING:    Other  - follow up in 4-6 weeks.     TIME SPENT WITH PATIENT:   24 minutes   Vinod Perez RD, LD  [facility]

## 2024-04-18 NOTE — PROGRESS NOTES
"[facility] - phone interpretor used for Syriac, start of visit w/friend Cassie who waited in [facility] for most of visit    PATIENT:  Mila Aguirre   MRN:         [id number]   :         1955  PEPITO:         2024      Dear AUSTEN Tan CNP,    I had the pleasure of seeing your patient, Mila Aguirre. Full intake/assessment was done to determine barriers to weight loss success and develop a treatment plan. Mila Aguirre is a 68 year old female interested in treatment of medical problems associated with excess weight. She has a height of 4' 11\", a weight of 238 lbs 0 oz, and the calculated Body mass index is 48.07 kg/m .    Assessment & Plan   Problem List Items Addressed This Visit       Obesity, Class III, BMI 40-49.9 (morbid obesity) (H) - Primary     Initial [facility]WM visit. Referred to dietitian. Baseline vitamin D and TSH ordered. BMP ordered for 2-3 months after starting Wegovy. Referred to [facility] for med check-in after starting Wegovy in 2 months.          Relevant Medications    Semaglutide-Weight Management (WEGOVY) 0.25 MG/0.5ML pen    Semaglutide-Weight Management (WEGOVY) 0.5 MG/0.5ML pen    Semaglutide-Weight Management (WEGOVY) 1 MG/0.5ML pen    Other Relevant Orders    Nutrition Referral    TSH with free T4 reflex    Basic metabolic panel    Med Therapy Management Referral     Other Visit Diagnoses       Low vitamin D level        Relevant Orders    Vitamin D Screen             PROGRAM OVERVIEW  Reviewed options at [facility].   All questions were answered. Education provided on chronic disease management of obesity.    SURGICAL WEIGHT LOSS   Option presented given pt BMI and current comorbid conditions. No current interest.     MEDICATIONS:  We discussed healthy habits to assist with weight loss. We reviewed medications associated with weight gain. We discussed the role of pharmacological agents in " "the treatment of obesity and the \"off-label\" use of medications in this practice. We reviewed medication that may assist with weight loss. Indications, contraindications, risks/benefits, and potential side effects were discussed.   Wegovy was prescribed. Discussed mechanism of action, common side effects, titration guidelines, and  discussed risks for pancreatitis/gallbladder problems/gastroparesis/low sugars/MTC/MEN2. Medication handout given in AVS. Discussed that medications must always be used together with lifestyle changes.. Reviewed rationale for long term use of pharmacotherapy in chronic disease management for obesity.      Referred by Dr. Kwadwo aguirre 9/26/23, for left total knee arthroplasty - goal weight?    AOM Considerations:  Phentermine:  Avoid due to history of cardiac abnormalities/MI  CAD:   Reports 2 heart attacks in [facility]   Palpitations:   \"Pain in left arm/chest pain from wrong exercises and medications and pain went away on left arm\"  HTN:    Yes  Topiramate: Confirm aspirin dose, diuretic consideration with furosemide   GLP-1: Monitor blood sugars  Constipation:   Yes - uses orange juice to help with it   Naltrexone:  No interaction seen  Wellbutrin: Monitor response to bupropion with Plavix and nebivolol response may be increased   Metformin: Monitor blood sugars   Contrave:  Qsymia:    Referred by Dr. Kwadwo aguirre 9/26/23, for left total knee arthroplasty - goal weight? 40 lbs weight loss needed    11/3/23 CMP WNL  CBC MCHC 31.2 ow WNL    Lipase WNL    5/5/23 vitamin D 20 ow WNL   Lipids elevated    4/27/23 hgA1c  preDM 5.7           PATIENT INSTRUCTIONS:  Pt Instructions:  Labs ordered.  Please call [phone] to set up a lab appt.   Start Wegovy (semaglutide)   0.25 mg once weekly for 4 weeks,   if tolerating increase to 0.5 mg weekly for 4 weeks,   if tolerating increase to 1 mg weekly    May use famotidine 20 mg twice daily as needed for nausea/heartburn when starting the " medication.     2. See the Medication Therapy Management (referral placed today) in 2 months for a Medication Check and to see how you're doing with Wegovy.    Goals:  Work with dietitians.  I recommend eating breakfast within an hour of waking up and eating every 4-6 hours during the day and try minimize snacking between meals. Prioritizing veggies and proteins for food choices is also recommended as medically appropriate.  Recommend 64oz (2L) water daily as medically appropriate (6-8 glasses)  Recommend pursing regular exercise. 5 minutes of exercise every other day or every 2 days is a great place to start with aiming for 30 minutes 5 times a week as an end goal.  If you can, try to get some strength training twice weekly while losing weight to help preserve your muscle!        Follow up:    Call [phone] to schedule next visit in 3-4 months.    54 minutes spent on the date of the encounter doing chart review, history and exam, review test results, counseling, developing plan of care, documentation, and further activities as noted above.     Weight Related Co-morbidities:          I have the following health issues associated with obesity: Knee pain, HTN, preDM   I have the following symptoms associated with obesity: Knee pain, left knee in particular and      Patient Active Problem List   Diagnosis    Cardiac abnormality    Chronic pain of both knees    History of MI (myocardial infarction)    Latent tuberculosis by blood test    Prediabetes    Primary hypertension    Vitamin D deficiency    Hypertriglyceridemia    Osteopenia    Positive QuantiFERON-TB Gold test    Obesity, Class III, BMI 40-49.9 (morbid obesity) (H)     Eats regular food and a little fruit. Thought the visit today was a visit for knee surgery 10 years ago. Had two visits - one for dietitian. Knee cracked yesterday and hurt and then went away     Weight History    Previously had weight loss surgery: no   Age pt became overweight:   Started  after marriage/having kids    The following factors contributed to weight gain:    See above   I have tried the following methods to lose weight:   Diet by self fruit/veggies and mostly at night   Mainly two meals - a lunch and a fruit for dinner.    My lowest weight since age 18 was: 229 lbs earlier this year   My highest weight since age 18 was: 260 lbs earlier this year   The most weight I have ever lost was: (lbs) Over last year went    Family hx of obesity: Yes - all of family and daughter    Are you interested in learning about weight loss surgery if appropriate?  Not right now    How has your weight changed over the last year?  Up/down this past year     Diet Recall     Wake Up: Diet by self fruit/veggies and mostly at night   Mainly two meals - a lunch and a fruit for dinner.    Breakfast    Lunch    Supper    Bedtime:   Snack between meals:   Snack in evening:                Typical snacks:    Caloric beverages per day. What type:    Water consumed daily: 3-4 bottles of water    Low yanni/diet drinks:   Alcohol:   Foods you crave:       none   None           Eating Disorder Screen    I binge eat No      I make myself vomit what I have eaten or use laxatives to get rid of food. no   I eat a large amount of food, like a loaf of bread, a box of cookies, a pint/quart of ice cream, all at once. no   I eat a large amount of food even when I am not hungry. no   I eat alone because I feel embarrassed and do not want others to see how much I have eaten. no   I eat until I am uncomfortably full. no   I feel bad, disgusted, or guilty after I overeat. Doesn't really have this - measures portions        Activity/Exercise History        How many times a week are you active for the purpose of exercise? For how long? Walking around the house   Every day at night time    What do you do for exercise?  Walking around the house    What keeps you from being more active? Not working with PT, but does home exercises.     Sleep  "history:  Stop Bang score 3, denies snoring/stopped breathing while asleep    Work/Social History Reviewed With Patient    Employment status is: no   Job is:    Is job active or sedentary?    Marital status? no   Who do you live with?  Yes - two granddaughters as well    Do you have children? are they overweight? Yes      Social History     Tobacco Use    Smoking status: Never    Smokeless tobacco: Never        Mental Health History Reviewed With Patient    No mental health concerns - not working w/a therapist       MEDICATIONS:   Current Outpatient Medications   Medication Sig Dispense Refill    aspirin 81 MG EC tablet Take 81 mg by mouth daily      atorvastatin (LIPITOR) 10 MG tablet Take 10 mg by mouth daily      clopidogrel (PLAVIX) 75 MG tablet Take 75 mg by mouth daily      furosemide (LASIX) 40 MG tablet Take 40 mg by mouth daily      irbesartan (AVAPRO) 150 MG tablet Take 150 mg by mouth At Bedtime      Semaglutide-Weight Management (WEGOVY) 0.25 MG/0.5ML pen Inject 0.25 mg Subcutaneous once a week For 4 weeks 2 mL 1    Semaglutide-Weight Management (WEGOVY) 0.5 MG/0.5ML pen Inject 0.5 mg Subcutaneous once a week 2 mL 1    Semaglutide-Weight Management (WEGOVY) 1 MG/0.5ML pen Inject 1 mg Subcutaneous once a week 2 mL 1     No current facility-administered medications for this visit.        ROS:    HEENT  H/O glaucoma:  no  Cardiovascular  CAD:   Reports 2 heart attacks in [facility]   Palpitations:   \"Pain in left arm/chest pain from wrong exercises and medications and pain went away on left arm\"  HTN:    yes  Gastrointestinal  GERD:   no  Constipation:   Yes - uses orange juice to help with it  Gastroparesis:  No  Liver Dz:   \"When in [facility] had pain stomach but never knew what it was for\"  H/O Pancreatitis:  no  H/O Gallbladder Dz: no  Psychiatric  Anxiety:   no  Depression:  no  Bipolar:  no  H/O ETOH/Drug abuse no  H/O eating disorder: no  Endocrine  PMH/FMH of MTC or MEN2:  no  Neurologic:  H/O " "seizures:   no  Headaches:  no  Memory Impairment:  no    H/O kidney stones:  When young  Kidney disease:  no  Current birth control:  Post menopausal    Labs/Records Reviewed:  Lab reviewed in [facility]    11/3/23 CMP WNL  CBC MCHC 31.2 ow WNL    Lipase WNL    5/5/23 vitamin D 20 ow WNL   Lipids elevated    4/27/23 hgA1c  preDM 5.7    BP Readings from Last 6 Encounters:   04/22/24 (!) 160/96       Pulse Readings from Last 6 Encounters:   04/22/24 98        PHYSICAL EXAM:  BP (!) 160/96   Pulse 98   Ht 4' 11\" (1.499 m)   Wt 238 lb (108 kg)   SpO2 96%   BMI 48.07 kg/m      GENERAL: Healthy, alert and no distress  EYES: Eyes grossly normal to inspection.    RESP: No audible wheeze, cough, or visible cyanosis.  No increased work of breathing. Lungs clear to auscultation  Heart: regular rate and rhythm.   SKIN: Visible skin clear.   NEURO: Mentation and speech appropriate for age.  PSYCH: Mentation appears normal, affect normal/bright, judgement and insight intact, normal speech and appearance well-groomed.        COUNSELING:   Reviewed obesity as a chronic disease and comprehensive management stratagies.        Weight Management Tips Handout given in AVS    We discussed the importance of physical activity including cardiovascular and strength training in maintaining a healthier weight and explored viable options.  Patient education of above written in AVS.      Sincerely,    Jany Gray PA-C     "

## 2024-04-22 ENCOUNTER — ALLIED HEALTH/NURSE VISIT (OUTPATIENT)
Dept: SURGERY | Facility: CLINIC | Age: 69
End: 2024-04-22
Payer: COMMERCIAL

## 2024-04-22 ENCOUNTER — OFFICE VISIT (OUTPATIENT)
Dept: SURGERY | Facility: CLINIC | Age: 69
End: 2024-04-22
Payer: COMMERCIAL

## 2024-04-22 VITALS
OXYGEN SATURATION: 96 % | DIASTOLIC BLOOD PRESSURE: 96 MMHG | SYSTOLIC BLOOD PRESSURE: 160 MMHG | HEART RATE: 98 BPM | HEIGHT: 59 IN | WEIGHT: 238 LBS | BODY MASS INDEX: 47.98 KG/M2

## 2024-04-22 DIAGNOSIS — E66.01 OBESITY, CLASS III, BMI 40-49.9 (MORBID OBESITY) (H): Primary | ICD-10-CM

## 2024-04-22 DIAGNOSIS — R79.89 LOW VITAMIN D LEVEL: ICD-10-CM

## 2024-04-22 PROBLEM — R76.12 POSITIVE QUANTIFERON-TB GOLD TEST: Status: ACTIVE | Noted: 2023-07-07

## 2024-04-22 PROBLEM — E66.813 OBESITY, CLASS III, BMI 40-49.9 (MORBID OBESITY) (H): Status: ACTIVE | Noted: 2024-04-22

## 2024-04-22 PROBLEM — M85.80 OSTEOPENIA: Status: ACTIVE | Noted: 2024-02-08

## 2024-04-22 PROBLEM — E78.1 HYPERTRIGLYCERIDEMIA: Status: ACTIVE | Noted: 2023-12-13

## 2024-04-22 PROCEDURE — 97802 MEDICAL NUTRITION INDIV IN: CPT

## 2024-04-22 PROCEDURE — 99204 OFFICE O/P NEW MOD 45 MIN: CPT | Performed by: PHYSICIAN ASSISTANT

## 2024-04-22 RX ORDER — SEMAGLUTIDE 0.25 MG/.5ML
0.25 INJECTION, SOLUTION SUBCUTANEOUS WEEKLY
Qty: 2 ML | Refills: 1 | Status: SHIPPED | OUTPATIENT
Start: 2024-04-22 | End: 2024-06-20

## 2024-04-22 RX ORDER — SEMAGLUTIDE 0.5 MG/.5ML
0.5 INJECTION, SOLUTION SUBCUTANEOUS WEEKLY
Qty: 2 ML | Refills: 1 | Status: SHIPPED | OUTPATIENT
Start: 2024-04-22 | End: 2024-06-20

## 2024-04-22 RX ORDER — SEMAGLUTIDE 1 MG/.5ML
1 INJECTION, SOLUTION SUBCUTANEOUS WEEKLY
Qty: 2 ML | Refills: 1 | Status: SHIPPED | OUTPATIENT
Start: 2024-04-22 | End: 2024-06-20

## 2024-04-22 RX ORDER — FUROSEMIDE 40 MG
40 TABLET ORAL DAILY PRN
COMMUNITY
Start: 2023-06-27

## 2024-04-22 NOTE — NURSING NOTE
Patient's measurements today were:    Neck = 14 inches    Waist = 57 inches    Hips = 57.5 inches

## 2024-04-22 NOTE — ASSESSMENT & PLAN NOTE
Initial MWM visit. Referred to dietitian. Baseline vitamin D and TSH ordered. BMP ordered for 2-3 months after starting Wegovy. Referred to [facility] for med check-in after starting Wegovy in 2 months.

## 2024-04-22 NOTE — PATIENT INSTRUCTIONS
"Nice to talk with you today. Below is the plan discussed.-  Jany Gray PA-C     Pt Instructions:  Labs ordered.  Please call [phone] to set up a lab appt.   Start Wegovy (semaglutide)   0.25 mg once weekly for 4 weeks,   if tolerating increase to 0.5 mg weekly for 4 weeks,   if tolerating increase to 1 mg weekly    May use famotidine 20 mg twice daily as needed for nausea/heartburn when starting the medication.     2. See the Medication Therapy Management (referral placed today) in 2 months for a Medication Check and to see how you're doing with Wegovy.    Goals:  Work with dietitians.  I recommend eating breakfast within an hour of waking up and eating every 4-6 hours during the day and try minimize snacking between meals. Prioritizing veggies and proteins for food choices is also recommended as medically appropriate.  Recommend 64oz (2L) water daily as medically appropriate (6-8 glasses)  Recommend pursing regular exercise. 5 minutes of exercise every other day or every 2 days is a great place to start with aiming for 30 minutes 5 times a week as an end goal.  If you can, try to get some strength training twice weekly while losing weight to help preserve your muscle!        Follow up:    Call [phone] to schedule next visit in 3-4 months.      WEGOVY (semaglutide)      Wegovy (semaglutide) injection 2.4 mg is an injectable prescription medicine used for adults with obesity (BMI ?30) or overweight (excess weight) (BMI ?27) who also have weight-related medical problems to help them lose weight and keep the weight off.  It is a GLP-1 agonist medication. GLP1 agonists stimulate the hormone GLP-1 in your body to allow you to feel full quickly and stay full longer.    Due to the shortage, You may need to be persistent and patient to get these initial dosages due to the shortage.  Once you are able to obtain the 0.25 and 0.5 mg dose \"8 weeks of therapy\" you can begin treatment.     Directions:  Start Wegovy " (semaglutide) 0.25 mg once weekly for 4 weeks, then if tolerating increase to 0.5 mg weekly for 4 weeks, then if tolerating increase to 1 mg weekly for 4 weeks, then if tolerating increase to 1.7 mg weekly for 4 weeks, then if tolerating increase to 2.4 mg weekly thereafter.      -Each Wegovy pen is a once weekly single-dose prefilled pen with a pen injector already built within the pen. Discard the Wegovy pen after use in sharps container.     Common Side Effects:    nausea, headache, diarrhea, stomach upset.  If these become unmanageable call or mychart.    Serious Side effects:   Pancreatitis (inflammation of the pancreas) has been associated with this type of medication, but is very rare.Symptoms of pancreatitis include: Pain in your upper stomach area which may travel to your back and be worse after eating.     Storage:   Store the prescription in the refrigerator. Once it is time to use the Wegovy pen, you can keep the pen at room temperature and it is good for up to 28 days at room temperature.     How to inject:  For a video on how to use the pen please go to:  https://www.[employer]/about-wegovy/how-to-use-the-wegovy-pen.html#itemTwo       For any questions or concerns please send a [employer] message to our team or call  [phone].  For emergencies please 911 or seek immediate medical care.     There is a small chance you may have some low blood sugar after taking the medication.   The signs of low blood sugar are:  Weakness  Shaky   Hungry  Sweating  Confusion      See below for ways to treat low blood sugar without adding in lots of extra calories.      Treating Low Blood Sugar    If you have symptoms of low blood sugar (sweating, shaking, dizzy, confused) eat 15 grams of carbs and wait 15 minutes:    Glucose Tabs are best for sugars under 70 -  Dex4 or BD Glucose tablets are good, you will need to take 3-4 of these to equal 15 grams.     One small box of raisins  4 oz fruit juice box or   cup fruit  juice  1 small apple  1 small banana    cup canned fruit in water    English muffin or a slice of bread with jelly   1 low fat frozen waffle with sugar-free syrup    cup cottage cheese with   cup frozen or fresh blueberries  1 cup skim or low-fat milk    cup whole grain cereal  4-6 crackers such as Triscuits      This medication is usually not covered by insurance and can be quite expensive. Sometimes a prior authorization is required, which may take up to 1-2 weeks for an insurance company to make a decision if they will cover the medication. Please be patient, you will be notified after a decision has been made.    Contact the nurse via [employer] or call [phone] if you have any questions or concerns. (Do not stop taking it if you don't think it's working. For some people it works without them knowing it.)     In order to get refills of this or any medication we prescribe you must be seen in the medical weight mgmt clinic every 2-4 months.    Using Your Wegovy Pen  Medicine (semaglutide)    Storing your pens  Store your pens in the refrigerator until you are ready to use them. Don't let them freeze.  Your pen may be stored at room temperature for 28 days or fewer. Just make sure the temperature doesn't get higher than 86 or lower than 46 degrees Fahrenheit.   Protect the pens from light.  Never use any pens that have .    Check your pen before use:  The liquid in the pen window should be clear and colorless. Bubbles are okay to see.   Do not use the pen if you can see the window contains any specks or it is cloudy or has changed color.  Make sure you have the medication and dose your health care provider prescribed.    Getting ready to inject:   1. Wash and dry your hands well.  2. Make sure the counter you use to place your supplies on is clean.  3. Make sure your injection time will not be interrupted by children or pets.  4. Have alcohol wipes or alcohol and cotton balls available to clean the  injection site.   5. Choose your injection site. It can be on your stomach, back of upper arms, or upper legs. Remember to change your injection site each time you inject. Try to be at least 1 inch away from the previous one. Stay at least 1 inch away from your belly button.     Inject your dose:  1. Pull off the grey cap off the pen. Throw it in the trash. Do not put the cap back on the pen.   2. Clean the injection site with alcohol.   3. Push the grey part of the pen firmly against your skin. This will start the injection.  4. When the pen is injecting, you will hear 2 clicks. The first click tells you the injection has started. The second one tells you the injection is continuing.   5. The injection is done when the yellow bar in the glass window at the bottom of the pen has stopped moving.   6. This injection is given 1 day a week. The pens come in  5 doses ranging from 0.25 mg to 2.4 mg. Each dose comes in a different color pen.  7. If you miss a dose, take is as soon as you remember. Do not take a missed dose, if it is within 2 days of the next dose.    8. Your injection may be best tolerated if given at night.     Disposing of your pens:  Dispose of your pens in a puncture-resistant container (hard plastic bottle) or Sharps container.  Check with the county you live in on how to dispose of the container.  Do not recycle the container with used pens.     Of note, you may not be able to  your medication right away. It may require a prior authorization from your insurance company. This may take a week or more.

## 2024-04-23 ENCOUNTER — TELEPHONE (OUTPATIENT)
Dept: SURGERY | Facility: CLINIC | Age: 69
End: 2024-04-23
Payer: COMMERCIAL

## 2024-04-23 ENCOUNTER — APPOINTMENT (OUTPATIENT)
Dept: INTERPRETER SERVICES | Facility: CLINIC | Age: 69
End: 2024-04-23
Payer: COMMERCIAL

## 2024-04-23 NOTE — TELEPHONE ENCOUNTER
"Prior Authorization Retail Medication Request    Medication/Dose: Semaglutide-Weight Management (WEGOVY) 0.25 MG/0.5ML pen,  Semaglutide-Weight Management (WEGOVY) 0.5 MG/0.5ML pen,  Semaglutide-Weight Management (WEGOVY) 1 MG/0.5ML pen  Diagnosis and ICD code (if different than what is on RX):  E66.01   New/renewal/insurance change PA/secondary ins. PA: NEW  Previously Tried and Failed:  diet, lifestyle    AOM Considerations:  Phentermine:   Avoid due to history of cardiac abnormalities/MI  CAD:                            Reports 2 heart attacks in [facility]   Palpitations:                \"Pain in left arm/chest pain from wrong exercises and medications and pain went away on left arm\"  HTN:                            Yes  Topiramate: Confirm aspirin dose, diuretic consideration with furosemide     GLP-1: Monitor blood sugars   Constipation:              Yes - uses orange juice to help with it           Naltrexone:      No interaction seen  Wellbutrin: Monitor response to bupropion with Plavix and nebivolol response may be increased         Metformin: Monitor blood sugars          Contrave:  Qsymia:    Rationale:  weight management in patient with BMI> 30. She has a height of 4' 11\", a weight of 238 lbs 0 oz, and the calculated Body mass index is 48.07 kg/m .     Insurance   Primary:  [employer] [place]  Insurance ID:  [id number]     Pharmacy Information (if different than what is on RX)  Name:  [id number] [place] [address] [facility] [address] [facility] [address] [facility] [address]   Phone:  [phone]   Fax: [phone]    "

## 2024-04-23 NOTE — TELEPHONE ENCOUNTER
General Call    Contacts         Type Contact Phone/Fax    04/23/2024 02:56 PM CDT Phone (Incoming) Mila Aguirre (Self) [phone] (H)          Reason for Call:  WEGOVY    What are your questions or concerns:  pt states she needs a PA for her Wegovy    Okay to leave a detailed message?: Yes at Cell number on file:    Telephone Information:   Mobile [phone]

## 2024-04-26 ENCOUNTER — LAB (OUTPATIENT)
Dept: LAB | Facility: CLINIC | Age: 69
End: 2024-04-26
Payer: COMMERCIAL

## 2024-04-26 DIAGNOSIS — E66.01 OBESITY, CLASS III, BMI 40-49.9 (MORBID OBESITY) (H): ICD-10-CM

## 2024-04-26 DIAGNOSIS — R79.89 LOW VITAMIN D LEVEL: ICD-10-CM

## 2024-04-26 PROCEDURE — 80048 BASIC METABOLIC PNL TOTAL CA: CPT

## 2024-04-26 PROCEDURE — 36415 COLL VENOUS BLD VENIPUNCTURE: CPT

## 2024-04-26 PROCEDURE — 82306 VITAMIN D 25 HYDROXY: CPT

## 2024-04-26 PROCEDURE — 84443 ASSAY THYROID STIM HORMONE: CPT

## 2024-04-27 LAB
ANION GAP SERPL CALCULATED.3IONS-SCNC: 13 MMOL/L (ref 7–15)
BUN SERPL-MCNC: 9.1 MG/DL (ref 8–23)
CALCIUM SERPL-MCNC: 9.4 MG/DL (ref 8.8–10.2)
CHLORIDE SERPL-SCNC: 104 MMOL/L (ref 98–107)
CREAT SERPL-MCNC: 0.6 MG/DL (ref 0.51–0.95)
DEPRECATED HCO3 PLAS-SCNC: 23 MMOL/L (ref 22–29)
EGFRCR SERPLBLD CKD-EPI 2021: >90 ML/MIN/1.73M2
GLUCOSE SERPL-MCNC: 87 MG/DL (ref 70–99)
POTASSIUM SERPL-SCNC: 4.2 MMOL/L (ref 3.4–5.3)
SODIUM SERPL-SCNC: 140 MMOL/L (ref 135–145)
TSH SERPL DL<=0.005 MIU/L-ACNC: 1.79 UIU/ML (ref 0.3–4.2)
VIT D+METAB SERPL-MCNC: 18 NG/ML (ref 20–50)

## 2024-04-29 ENCOUNTER — TELEPHONE (OUTPATIENT)
Dept: SURGERY | Facility: CLINIC | Age: 69
End: 2024-04-29
Payer: COMMERCIAL

## 2024-04-29 ENCOUNTER — APPOINTMENT (OUTPATIENT)
Dept: INTERPRETER SERVICES | Facility: CLINIC | Age: 69
End: 2024-04-29
Payer: COMMERCIAL

## 2024-04-29 DIAGNOSIS — R79.89 LOW VITAMIN D LEVEL: Primary | ICD-10-CM

## 2024-04-29 NOTE — TELEPHONE ENCOUNTER
Called pt in regards to lab message from MARIANO Carmichael.  Utilized  Sonia.  Informed pt that:   Vitamin D slightly low and recommend starting over the counter supplement at 1-2000 international unit(s) daily and we'll recheck this in 3 months.   Order is in for vitamin D lab and can call [facility] to make appt in 3 months.    Also informed pt that kidney and thyroid fxn as well as sodium and K+ looked good.   Patient verbalized understanding and is agreeable to plan.      Pt stated that the Wegovy was denied by her insurance.    Informed pt that we didn't receive that notice yet.  There is a PA pending.    Pt stated that at first was told the med was not available.  Then she was told her that insurance didn't cover the Wegovy and that she needed to let   $ 1000 per month.  Pt can't afford this.     Informed pt that PA started and will take > 2 weeks to hear back from insurance.   We will notify pt when we hear back.  Patient verbalized understanding and is agreeable to plan.  Janeen Garcias, MS, RD, RN

## 2024-05-02 ENCOUNTER — APPOINTMENT (OUTPATIENT)
Dept: INTERPRETER SERVICES | Facility: CLINIC | Age: 69
End: 2024-05-02
Payer: COMMERCIAL

## 2024-05-02 NOTE — TELEPHONE ENCOUNTER
Fax from [facility] Approval of prescription coverage for Wegovy 0.25mg/0.5ml solution auto-injector begins 02/02/2024 and ends 05/02/2025.

## 2024-05-02 NOTE — TELEPHONE ENCOUNTER
Called patient with assistance of Nadiya with  Services.    Informed patient that her insurance has approved the use of Wegovy until May of 2025.    Also informed patient that this writer would notify the patient's pharmacy of approval.    Patient expressed understanding, no further questions.    Pharmacy was notified via phone call.    Sarah Fairchild RN

## 2024-05-02 NOTE — TELEPHONE ENCOUNTER
Paper PA forms filled out and faxed with pertaining medical records to [facility].    Awaiting response.

## 2024-05-28 NOTE — PATIENT INSTRUCTIONS
Bi Zaragoza!      It was great chatting with you again today! Here's a summary of the goals we discussed:    1. Include protein at all meals  - Try adding eggs, yogurt, beans or meat to your breakfast and dinner  - Keep having chicken and/or eggs at lunch     2. Keep working at increasing physical activity  - Park further away in parking lots  - When watching TV try doing some stretches or working with resistance bands          Plan on following up in 1-2 months. This can be scheduled via our call center at . Reach out sooner with any questions or concerns. Have a great day!      Inessa Grigsby RD, LD  Clinical Dietitian

## 2024-05-28 NOTE — PROGRESS NOTES
MEDICAL WEIGHT LOSS FOLLOW UP    Reason for visit: medical weight loss     Yocasta #[id number]    DIAGNOSIS:  Class III Obesity    ANTHROPOMETRICS:  Initial Weight: 238  pounds  Current Weight:  230 lbs 1.6 oz   BMI: 48.07 kg/m2    Weight Loss Medications:   Wegovy    NUTRITION HISTORY:  Breakfast: corn flakes w/milk   Lunch:  soup  salad (greens, tomato, avocado, chicken, hard boiled egg, no dressing - maybe a little lemon juice)  Dinner: cookie w/coffee  oats w/milk   Snacks: [morning] fruit (gunner, apple, peach)  [afternoon] none  [evenings] none  Beverage choices: water (48oz), coffee, tea   How often do you dine out: rare    Exercise:  Walks in the afternoon while girls ride on their bikes 3x/week     Additional Information: Pt has started Wegovy with overall good tolerance outside of some headaches and GI upset that resolved ~2 days post-injection. She will notify clinic if symptoms worsen or become concerning. Pt eating breakfast daily and working on increasing physical activity. Reviewed protein sources and recommended add to breakfast and dinner.       EVALUATION/PROGRESS TOWARDS GOALS:  Previous Goals:   Eat breakfast daily (fruit, toast , eggs, leftovers from dinner) - met  Eat protein at each meal - not met  Increase walking to 3-4X per week - met    Previous Nutrition Diagnosis:  Obese class III related to excess energy intake and self-monitoring deficit as evidenced by BMI of 48.07 kg/m2.  No change, modified below     Current Nutrition Diagnosis:   Obese class III related to excess energy intake and self-monitoring deficit as evidenced by BMI of 46.47 kg/m2.    INTERVENTION:    Nutrition Prescription:  Recommend modified nutrient intake by decreasing energy intake.    Implementation:    Nutrition Education (Content):  Discussed previous goals and determined new goals  Encourage physical activity  Supported patient in attempted weight loss and behavior changes  Congratulated patient  on successful weight loss   Patient verbalizes understanding of diet by stating she will increase protein  Anticipate fair-good compliance    Goals:  Include protein at all meals  Continue to work more physical activity into your day    Follow Up/Monitoring:  Other  -  patient to follow up in 4-8 weeks    Time Spent With Patient:  20 Minutes    Inessa Grigsby RD, LD  Clinical Dietitian

## 2024-05-29 ENCOUNTER — ALLIED HEALTH/NURSE VISIT (OUTPATIENT)
Dept: SURGERY | Facility: CLINIC | Age: 69
End: 2024-05-29
Payer: COMMERCIAL

## 2024-05-29 VITALS — HEIGHT: 59 IN | BODY MASS INDEX: 46.39 KG/M2 | WEIGHT: 230.1 LBS

## 2024-05-29 DIAGNOSIS — E66.01 OBESITY, CLASS III, BMI 40-49.9 (MORBID OBESITY) (H): Primary | ICD-10-CM

## 2024-05-29 PROCEDURE — 97803 MED NUTRITION INDIV SUBSEQ: CPT

## 2024-06-20 ENCOUNTER — VIRTUAL VISIT (OUTPATIENT)
Dept: CARDIOLOGY | Facility: CLINIC | Age: 69
End: 2024-06-20
Attending: PHYSICIAN ASSISTANT
Payer: COMMERCIAL

## 2024-06-20 ENCOUNTER — TELEPHONE (OUTPATIENT)
Dept: SURGERY | Facility: CLINIC | Age: 69
End: 2024-06-20

## 2024-06-20 VITALS
DIASTOLIC BLOOD PRESSURE: 58 MMHG | WEIGHT: 220 LBS | BODY MASS INDEX: 44.35 KG/M2 | HEIGHT: 59 IN | SYSTOLIC BLOOD PRESSURE: 126 MMHG

## 2024-06-20 DIAGNOSIS — I25.10 CORONARY ARTERY DISEASE INVOLVING NATIVE CORONARY ARTERY OF NATIVE HEART WITHOUT ANGINA PECTORIS: ICD-10-CM

## 2024-06-20 DIAGNOSIS — I10 PRIMARY HYPERTENSION: ICD-10-CM

## 2024-06-20 DIAGNOSIS — R60.0 LOWER EXTREMITY EDEMA: ICD-10-CM

## 2024-06-20 DIAGNOSIS — E66.01 OBESITY, CLASS III, BMI 40-49.9 (MORBID OBESITY) (H): Primary | ICD-10-CM

## 2024-06-20 DIAGNOSIS — E55.9 VITAMIN D DEFICIENCY: ICD-10-CM

## 2024-06-20 DIAGNOSIS — M85.80 OSTEOPENIA, UNSPECIFIED LOCATION: ICD-10-CM

## 2024-06-20 RX ORDER — SEMAGLUTIDE 0.25 MG/.5ML
0.25 INJECTION, SOLUTION SUBCUTANEOUS WEEKLY
Qty: 2 ML | Refills: 1 | OUTPATIENT
Start: 2024-06-20 | End: 2024-06-26 | Stop reason: DRUGHIGH

## 2024-06-20 RX ORDER — AMLODIPINE BESYLATE 10 MG/1
10 TABLET ORAL DAILY
COMMUNITY

## 2024-06-20 ASSESSMENT — PAIN SCALES - GENERAL: PAINLEVEL: MODERATE PAIN (5)

## 2024-06-20 NOTE — NURSING NOTE
Is the patient currently in the state of [place]? YES    Visit mode:TELEPHONE    If the visit is dropped, the patient can be reconnected by: TELEPHONE VISIT: Phone number: [phone]    Will anyone else be joining the visit? NO  (If patient encounters technical issues they should call [phone])    How would you like to obtain your AVS? MyChart    Are changes needed to the allergy or medication list? No    Are refills needed on medications prescribed by this physician? NO    Reason for visit: Consult    Reanna OCHOA

## 2024-06-20 NOTE — LETTER
6/20/2024      RE: Mila Aguirre  [address]   [address]  [place] [address]       Dear Colleague,    Thank you for the opportunity to participate in the care of your patient, Mila Aguirre, at the [facility] [place] [facility]. Please see a copy of my visit note below.    Medication Therapy Management (MTM) Encounter    ASSESSMENT:                            Medication Adherence/Access: No issues identified    Weight Management   Would benefit from remaining on Wegovy at 0.25 mg weekly due to progress made with weight loss at lowest dose.     Hypertension /Lower Extremity Edema:  Stable. Last blood pressure at goal < 130/80 mmHg. Would benefit from checking blood pressure at home a couple times per week.     CAD:  LDL not at goal < 70. May benefit from increase atorvastatin to 20 mg daily. Due to > 1 year on DAPT, do wonder if patient would benefit from stopping clopidogrel and continue with solely aspirin 81mg daily. Would benefit from nitrostat RX due to History MI.     Osteopenia/Vitamin D Deficiency:  Vitamin D not at goal > 30, would recommend starting vitamin D 2000 international unit(s) additionally daily. Typically with vitamin D < 20, would recommend 5000 international unit(s) daily until levels are improved (~3 months) but to minimize #pills reasonable to do the 2000 international unit(s) daily. Likely getting in ~1000 mg calcium between diet and supplement daily.     PLAN:                            Continue Wegovy at 0.25 mg weekly for now until next follow up with Jany Gray PA-C.   Start vitamin D 2000 international unit(s) daily     Pharmacist sent note to provider regarding:   -Consider increase atorvastatin to 20 mg daily  -Consider stop clopidogrel and continue solely aspirin 81 mg daily as > 1 year DAPT post MI.   -Prescribing nitrostat RX     Follow-up: Return in about 4 months (around  "10/20/2024) for Medication Therapy Management Pharmacist Visit, Call [phone] to schedule.    SUBJECTIVE/OBJECTIVE:                          Mila Aguirre is a 69 year old female seen for an initial visit. She was referred to me from Jany Gray PA-C.  (ID# [id number]) was used during today's visit.     Reason for visit: Wegovy start follow up.    Allergies/ADRs: Reviewed in chart  Past Medical History: Reviewed in chart  Tobacco: She reports that she has never smoked. She has never used smokeless tobacco.  Alcohol: not currently using  Caffeine: 1 cup coffee/day    Medication Adherence/Access: no issues reported    Weight Management  Wegovy 0.25 mg once weekly     Patient working with [facility], last seen by Jany Gray PA-C. Patient reports no current medication side effects. Has taken 0.25 mg the last 6 weeks. She is noticing smaller portions, aquino more quickly.   Nutrition/Eating Habits: met with dietitian 4/22/2024. Diet Recall:   Breakfast: hardboiled egg + plantain, boiled or 1 cup oatmeal   Lunch: salad + chicken, boiled or beef + soup   Dinner: plantain, egg + crema, tortilla  Snacks: between lunch and supper: fruit or small snack  Liquid calories: apple juice at times, no soda.     Exercise/Activity: walking with walker while grandchildren bicycle most days during summer. Around the house will walk with walker on own.     Current Weight: 220 lb   Initial Consult Weight: 238 lb   Cumulative Weight Loss: -18 lb, -7.6%     Wt Readings from Last 4 Encounters:   06/20/24 99.8 kg (220 lb)   05/29/24 104.4 kg (230 lb 1.6 oz)   04/22/24 108 kg (238 lb)   09/26/23 104.1 kg (229 lb 8 oz)     Estimated body mass index is 44.41 kg/m  as calculated from the following:    Height as of this encounter: 1.499 m (4' 11.02\").    Weight as of this encounter: 99.8 kg (220 lb).    Hypertension/Lower Extremity Edema:  Irbesartran 150 mg daily  Amlodipine 10 mg daily   Furosemide 40 " "mg daily, now taking as needed     Patient reports no current medication side effects.  Patient  does have blood pressure at home but hasn't checked recently .  She used to nabil furosemide every day but started taking more as needed.   As of now no issues of lower extremity edema.   Blood pressure 2/8/2024 126/58 mmHg.        Hyperlipidemia/CAD:  Atorvastatin 10mg daily  Aspirin 81 mg daily   Clopidogrel 75 mg daily    Patient reports no significant myalgias or other side effects. MI in 2018.   Reports no current issues of chest pain/chest tightness.        Osteopenia/Vitamin D Deficiency:  Calcium-vitamin D 600 mg-500 international unit(s) once daily     She more recently started calcium due to last DEXA results. She since started calcium, getting in 2 servings calcium/day otherwise in diet (milk, cheese).    Dual-X-ray Absorptiometry (DXA) Results  1/16/2024  Skeletal Site BMD (gm/cm2) T-Score Z-Score % Change from Previous Scan   dated: N/A   Spine (L1, L2, L3, L4) 1.047 0.0 2.0 N/A   Left Hip (Total) 0.760 -1.5 -0.2 N/A   Left Hip (Femoral neck) 0.725 -1.3 0.4 N/A   Right Hip (Total) 0.819 -1.1 0.3 N/A     Right Hip (Femoral neck) 0.799 -0.6 1.0 N/A     *N/A indicates that measurements were either not needed or not valid   Vitamin D Deficiency Screening Results:  Lab Results   Component Value Date    VITDT 18 (L) 04/26/2024     Today's Vitals: /58   Ht 1.499 m (4' 11.02\")   Wt 99.8 kg (220 lb)   BMI 44.41 kg/m    ----------------    I spent 40 minutes with this patient today. All changes were made via collaborative practice agreement with Jany Gray PA-C . A copy of the visit note was provided to the patient's provider(s).    A summary of these recommendations was sent via [employer].         Medication Therapy Recommendations  Coronary artery disease involving native coronary artery of native heart without angina pectoris    Current Medication: atorvastatin (LIPITOR) 10 MG tablet   Rationale: Dose too low " - Dosage too low - Effectiveness   Recommendation: Increase Dose - atorvastatin 20 MG tablet   Status: Contact Provider - Awaiting Response          Current Medication: clopidogrel (PLAVIX) 75 MG tablet   Rationale: No medical indication at this time - Unnecessary medication therapy - Indication   Recommendation: Discontinue Medication   Status: Contact Provider - Awaiting Response          Rationale: Untreated condition - Needs additional medication therapy - Indication   Recommendation: Start Medication - NITROSTAT SL   Status: Contact Provider - Awaiting Response         Obesity, Class III, BMI 40-49.9 (morbid obesity) (H)    Current Medication: Semaglutide-Weight Management (WEGOVY) 0.5 MG/0.5ML pen (Discontinued)   Rationale: Dose too high - Dosage too high - Safety   Recommendation: Decrease Dose - Wegovy 0.25 MG/0.5ML Soaj   Status: Accepted per CPA         Vitamin D deficiency    Rationale: Untreated condition - Needs additional medication therapy - Indication   Recommendation: Start Medication - vitamin D 50 MCG (2000 UT) Caps   Status: Accepted - no CPA Needed                Please do not hesitate to contact me if you have any questions/concerns.     Sincerely,     Lauren T. Bloch, RPOMAIRA

## 2024-06-20 NOTE — PROGRESS NOTES
"Virtual Visit Details    Type of service:  Telephone Visit   Phone call duration: *** minutes   Originating Location (pt. Location): {patient location:234298::\"Home\"}  {PROVIDER LOCATION On-site should be selected for visits conducted from your clinic location or adjoining [facility] hospital, academic office, or other nearby [facility] building. Off-site should be selected for all other provider locations, including home:[id number]}  Distant Location (provider location):  {virtual location provider:[id number]}      "

## 2024-06-20 NOTE — PROGRESS NOTES
Medication Therapy Management (MTM) Encounter    ASSESSMENT:                            Medication Adherence/Access: No issues identified    Weight Management   Would benefit from remaining on Wegovy at 0.25 mg weekly due to progress made with weight loss at lowest dose.     Hypertension /Lower Extremity Edema:  Stable. Last blood pressure at goal < 130/80 mmHg. Would benefit from checking blood pressure at home a couple times per week.     CAD:  LDL not at goal < 70. May benefit from increase atorvastatin to 20 mg daily. Due to > 1 year on DAPT, do wonder if patient would benefit from stopping clopidogrel and continue with solely aspirin 81mg daily. Would benefit from nitrostat RX due to History MI.     Osteopenia/Vitamin D Deficiency:  Vitamin D not at goal > 30, would recommend starting vitamin D 2000 international unit(s) additionally daily. Typically with vitamin D < 20, would recommend 5000 international unit(s) daily until levels are improved (~3 months) but to minimize #pills reasonable to do the 2000 international unit(s) daily. Likely getting in ~1000 mg calcium between diet and supplement daily.     PLAN:                            Continue Wegovy at 0.25 mg weekly for now until next follow up with Jany Gray PA-C.   Start vitamin D 2000 international unit(s) daily     Pharmacist sent note to provider regarding:   -Consider increase atorvastatin to 20 mg daily  -Consider stop clopidogrel and continue solely aspirin 81 mg daily as > 1 year DAPT post MI.   -Prescribing nitrostat RX     Follow-up: Return in about 4 months (around 10/20/2024) for Medication Therapy Management Pharmacist Visit, Call [phone] to schedule.    SUBJECTIVE/OBJECTIVE:                          Mila Aguirre is a 69 year old female seen for an initial visit. She was referred to me from Jany Gray PA-C.  (ID# [id number]) was used during today's visit.     Reason for visit: Wegovy start follow up.    Allergies/ADRs:  "Reviewed in chart  Past Medical History: Reviewed in chart  Tobacco: She reports that she has never smoked. She has never used smokeless tobacco.  Alcohol: not currently using  Caffeine: 1 cup coffee/day    Medication Adherence/Access: no issues reported    Weight Management   Wegovy 0.25 mg once weekly     [facility] working with [facility], last seen by Jany Gray PA-C. Patient reports no current medication side effects. Has taken 0.25 mg the last 6 weeks. She is noticing smaller portions, aquino more quickly.   Nutrition/Eating Habits: met with dietitian 4/22/2024. Diet Recall:   Breakfast: hardboiled egg + plantain, boiled or 1 cup oatmeal   Lunch: salad + chicken, boiled or beef + soup   Dinner: plantain, egg + crema, tortilla  Snacks: between lunch and supper: fruit or small snack  Liquid calories: apple juice at times, no soda.     Exercise/Activity: walking with walker while grandchildren bicycle most days during summer. Around the house will walk with walker on own.     Current Weight: 220 lb   Initial Consult Weight: 238 lb   Cumulative Weight Loss: -18 lb, -7.6%     Wt Readings from Last 4 Encounters:   06/20/24 99.8 kg (220 lb)   05/29/24 104.4 kg (230 lb 1.6 oz)   04/22/24 108 kg (238 lb)   09/26/23 104.1 kg (229 lb 8 oz)     Estimated body mass index is 44.41 kg/m  as calculated from the following:    Height as of this encounter: 1.499 m (4' 11.02\").    Weight as of this encounter: 99.8 kg (220 lb).    Hypertension /Lower Extremity Edema:  Irbesartran 150 mg daily  Amlodipine 10 mg daily   Furosemide 40 mg daily, now taking as needed     Patient reports no current medication side effects.  Patient  does have blood pressure at home but hasn't checked recently .  She used to nabil furosemide every day but started taking more as needed.   As of now no issues of lower extremity edema.   Blood pressure 2/8/2024 126/58 mmHg.        Hyperlipidemia /CAD:  Atorvastatin 10mg daily  Aspirin " "81 mg daily   Clopidogrel 75 mg daily    Patient reports no significant myalgias or other side effects. MI in 2018.   Reports no current issues of chest pain/chest tightness.        Osteopenia/Vitamin D Deficiency:  Calcium-vitamin D 600 mg-500 international unit(s) once daily     She more recently started calcium due to last DEXA results. She since started calcium, getting in 2 servings calcium/day otherwise in diet (milk, cheese).    Dual-X-ray Absorptiometry (DXA) Results  1/16/2024  Skeletal Site BMD (gm/cm2) T-Score Z-Score % Change from Previous Scan   dated: N/A   Spine (L1, L2, L3, L4) 1.047 0.0 2.0 N/A   Left Hip (Total) 0.760 -1.5 -0.2 N/A   Left Hip (Femoral neck) 0.725 -1.3 0.4 N/A   Right Hip (Total) 0.819 -1.1 0.3 N/A     Right Hip (Femoral neck) 0.799 -0.6 1.0 N/A     *N/A indicates that measurements were either not needed or not valid   Vitamin D Deficiency Screening Results:  Lab Results   Component Value Date    VITDT 18 (L) 04/26/2024     Today's Vitals: /58   Ht 1.499 m (4' 11.02\")   Wt 99.8 kg (220 lb)   BMI 44.41 kg/m    ----------------    I spent 40 minutes with this patient today. All changes were made via collaborative practice agreement with Jany Gray PA-C . A copy of the visit note was provided to the patient's provider(s).    A summary of these recommendations was sent via [employer].    Lauren Bloch, PharmD, BCACP   Medication Therapy Management Pharmacist   [facility]    Telemedicine Visit Details  Type of service:  Telephone visit  Start Time:  10:06 AM  End Time:  10:46 AM     Medication Therapy Recommendations  Coronary artery disease involving native coronary artery of native heart without angina pectoris    Current Medication: atorvastatin (LIPITOR) 10 MG tablet   Rationale: Dose too low - Dosage too low - Effectiveness   Recommendation: Increase Dose - atorvastatin 20 MG tablet   Status: Contact Provider - Awaiting Response    "       Current Medication: clopidogrel (PLAVIX) 75 MG tablet   Rationale: No medical indication at this time - Unnecessary medication therapy - Indication   Recommendation: Discontinue Medication   Status: Contact Provider - Awaiting Response          Rationale: Untreated condition - Needs additional medication therapy - Indication   Recommendation: Start Medication - NITROSTAT SL   Status: Contact Provider - Awaiting Response         Obesity, Class III, BMI 40-49.9 (morbid obesity) (H)    Current Medication: Semaglutide-Weight Management (WEGOVY) 0.5 MG/0.5ML pen (Discontinued)   Rationale: Dose too high - Dosage too high - Safety   Recommendation: Decrease Dose - Wegovy 0.25 MG/0.5ML Soaj   Status: Accepted per CPA         Vitamin D deficiency    Rationale: Untreated condition - Needs additional medication therapy - Indication   Recommendation: Start Medication - vitamin D 50 MCG (2000 UT) Caps   Status: Accepted - no CPA Needed

## 2024-06-20 NOTE — LETTER
"2024    To:   [facility]    RE: Mila Aguirre  [address]   [address]  [place] [address]  : 1955  MRN: [id number]  Policy #: [id number]    To Whom It May Concern,    I am writing on behalf of my patient, Mila Aguirre to document the medical necessity of Wegovy for the treatment of obesity. This letter provides information about the patient's medical history and diagnosis and a statement summarizing my treatment rationale.     Summary of Patient History and Diagnosis  Mila Aguirre is a 69 year old female with a diagnosis of obesity (BMI at least 30 kg/m2). Mila was started on Wegovy in May 2024 by Jany Gray PA-C  at [facility].     Estimated body mass index is 44.41 kg/m  as calculated from the following:    Height as of an earlier encounter on 24: 1.499 m (4' 11.02\").    Weight as of an earlier encounter on 24: 99.8 kg (220 lb).    Treatment Rationale  Despite lifestyle/health improvements with nutrition, exercise, and behavioral changes for at least 6 months, the patient has been unable to achieve significant and sustainable weight loss.     Patient was started on Wegovy at 0.25 mg weekly in May 2024 and she has continued on this dose since that time. Her starting weight prior to starting Wegovy was 238 lb and current weight after 6 weeks on Wegovy is 220 lb, meaning she has lost 18 lb or > 7% of body weight from baseline. Due to this efficacy there is no need to increase further in dosing at this time as the patient is achieving clinically meaningful weight loss. Therefore, requesting continued coverage of Wegovy at 0.25 mg weekly. Clinicians have learned that therapy is individualized and there is no mandate that states that increased dosing is required. Why would a clinician increase a dose of medication when there is no clinical reason to do so, this is just putting increased risk of side effects " unnecessarily in the patient's future.     Wegovy (semaglutide) is an FDA-approved medication for chronic weight management in adults with a BMI of 30 or higher or a BMI of 27 or higher with weight-related comorbidity. The patient's BMI qualifies them for Wegovy, which has shown remarkable efficacy and a favorable safety profile. Patient has no history of pancreatitis. The patient has no personal or family history of medullary thyroid carcinoma or MEN2.     The patient's unsuccessful weight management attempts and previous medication failures highlight the need for Wegovy as a medically necessary treatment option. Denying coverage would hinder the patient's progress and increase the risk of obesity-related health conditions.    I kindly request that you review Mila's case and reconsider coverage and allow a quantity limit override for Wegovy at 0.25 mg weekly as an integral part of their obesity treatment plan.         Duration  (Length of time treatment/medication/equipment (item in question) is necessary - not to exceed 12 months)       Summary  In summary, Wegovy at 0.25 mg is medically necessary for this patient s medical condition. Please call my office at [phone] if I can provide you with any additional information to approve my request. I look forward to receiving your timely response and approval of this request.     Sincerely,    Jany Gray Prior Authorization  [facility]

## 2024-06-20 NOTE — TELEPHONE ENCOUNTER
PA Initiation    Medication: WEGOVY 0.25 MG/0.5ML SC SOAJ  Insurance Company: [employer] - Phone [phone] Fax [phone]  Pharmacy Filling the Rx: [id number] - [place], [place] - [address] [place] [address] 7 AT [facility] & [facility] 7  Filling Pharmacy Phone:    Filling Pharmacy Fax:    Start Date: 6/20/2024    Max qty limit is 4ml per 180 days.  Faxed Qty limit PA form to get PA approved until Office visit in 4 months

## 2024-06-20 NOTE — Clinical Note
Going to stay at 0.25 mg dose as she has lost already 7.6% of body weight from baseline since starting in the last 6+ weeks. So will continue at this dose. Do think she is getting in enough nourishment, made significant dietary changes. Think it would be good to see her in 2 months after you. Li WEST

## 2024-06-20 NOTE — PATIENT INSTRUCTIONS
"Recommendations from today's MTM visit:                                                       Continue Wegovy at 0.25 mg weekly for now until next follow up with Jany Gray PA-C. Refills sent to pharmacy.   Start vitamin D 2000 international unit(s) daily     Follow-up: Return in about 4 months (around 10/20/2024) for Medication Therapy Management Pharmacist Visit, Call [phone] to schedule.    It was great speaking with you today.  I value your experience and would be very thankful for your time in providing feedback in our clinic survey. In the next few days, you may receive an email or text message from [facility] [employer] with a link to a survey related to your  clinical pharmacist.\"     To schedule another MTM appointment, please call the clinic directly or you may call the MTM scheduling line at [phone] or toll-free at [phone].     My Clinical Pharmacist's contact information:                                                      Please feel free to contact me with any questions or concerns you have.      Lauren Bloch, PharmD  Medication Therapy Management Pharmacist   [facility]             "

## 2024-06-21 NOTE — TELEPHONE ENCOUNTER
PRIOR AUTHORIZATION DENIED    Medication: WEGOVY 0.25 MG/0.5ML SC SOAJ  Insurance Company: [employer] - Phone [phone] Fax [phone]  Denial Date: 6/21/2024  Denial Reason(s):     Appeal Information:  [phone] fax [phone]  Patient Notified:     Sent the quantity limit request but they didn't have enough information.  Do you want to appeal for the 0.25mg or =move to the 0.5mg

## 2024-06-24 NOTE — TELEPHONE ENCOUNTER
Sounds good. Please provide an appeal letter and I can fax it to Patient's appeal office.    Thank You!    Lauren Castrejon [facility] Pharmacy Liaison  ealth [email or web address]  [email or web address]  Phone: [phone]  Fax: [phone]

## 2024-06-25 ENCOUNTER — TELEPHONE (OUTPATIENT)
Dept: SURGERY | Facility: CLINIC | Age: 69
End: 2024-06-25
Payer: COMMERCIAL

## 2024-06-25 DIAGNOSIS — E66.01 OBESITY, CLASS III, BMI 40-49.9 (MORBID OBESITY) (H): Primary | ICD-10-CM

## 2024-06-25 NOTE — TELEPHONE ENCOUNTER
Medication Question or Refill        What medication are you calling about (include dose and sig)?: wegovy    Preferred Pharmacy:   [id number] - [place], [place] - [address] [address] AT [facility] 7  [address] [address]  Phone: [phone] Fax: [phone]      Controlled Substance Agreement on file:   CSA -- Patient Level:    CSA: None found at the patient level.       Who prescribed the medication?: Jany Gray    Do you need a refill? Yes    When did you use the medication last?     Patient offered an appointment? No    Do you have any questions or concerns?  Yes: calling re: denial from insurance      Could we send this information to you in [email or web address] or would you prefer to receive a phone call?:   Patient would prefer a phone call   Okay to leave a detailed message?: Yes at Other phone number:  [phone]*

## 2024-06-25 NOTE — TELEPHONE ENCOUNTER
Medication Appeal Initiation    Medication: WEGOVY 0.25 MG/0.5ML SC SOAJ  Appeal Start Date:  6/25/2024  Insurance Company: [employer]  Insurance Phone: [phone]  Insurance Fax: [phone]  Comments:    Appeal faxed to [phone]

## 2024-06-25 NOTE — TELEPHONE ENCOUNTER
Medication Appeal Request    Please initiate an appeal for the requested medication: WEGOVY 0.25 MG/0.5ML SC SOAJ    Has a letter of medical necessity been completed in EPIC?   Yes    Any additional lab values/information to include? No    Would you like to include any research articles? No              If yes please include the hyperlink(s) below or fax to    [phone] for Specialty/Retail               [phone] for Infusion/Clinic Administered.                Include the patients name and MRN on the fax cover sheet.

## 2024-06-25 NOTE — TELEPHONE ENCOUNTER
Returned pt call with .    1st attempt was abruptly disconnected.    2nd attempt no answer, left VM informing pt that we would call again tomorrow.    Sarah RAMIREZ RN

## 2024-06-26 NOTE — TELEPHONE ENCOUNTER
Esperanza's friend and designated alternative  Cassie Tran called with questions.    She reports Esperanza sent her a picture of the denial letter for Wegovy 0.25 mg.    Informed Cassie that per chart review it appears both the PA and letter of appeal Lauren Bloch wrote for additional allowance for Wegovy 0.25 mg was denied.    Informed Cassie that writer would reach out to care team for next steps.    Sarah RAMIREZ RN

## 2024-06-26 NOTE — TELEPHONE ENCOUNTER
increase Wegovy to 0.5 mg weekly sent into pharmacy. CPA with Jany Gray PA-C.     Lauren Bloch, PharmD, BCACP   Medication Therapy Management Pharmacist   [facility]

## 2024-06-26 NOTE — TELEPHONE ENCOUNTER
PRIOR AUTHORIZATION DENIED    Medication: WEGOVY 0.25 MG/0.5ML SC SOAJ  Insurance Company: [employer] - Phone [phone] Fax [phone]  Denial Date: 6/21/2024  Denial Reason(s):   Appeal Information:     Patient Notified:

## 2024-06-26 NOTE — TELEPHONE ENCOUNTER
Returned call to patient's support person Cassie Tran.    Informed Cassie that the plan is to increase to the 0.5 mg dose since this will be covered.    Let her know that she was sent 1 month + 1 refill of this dose and this will get her to her next appt with Jany QUINTANA on 8/20.    Pharmacy confirmed.    She will pass message along to patient.    Sarah RAMIREZ RN

## 2024-07-01 ENCOUNTER — ALLIED HEALTH/NURSE VISIT (OUTPATIENT)
Dept: SURGERY | Facility: CLINIC | Age: 69
End: 2024-07-01
Payer: COMMERCIAL

## 2024-07-01 VITALS — BODY MASS INDEX: 45.86 KG/M2 | WEIGHT: 227.2 LBS

## 2024-07-01 DIAGNOSIS — E66.01 OBESITY, CLASS III, BMI 40-49.9 (MORBID OBESITY) (H): Primary | ICD-10-CM

## 2024-07-01 PROCEDURE — 97803 MED NUTRITION INDIV SUBSEQ: CPT

## 2024-07-01 NOTE — PATIENT INSTRUCTIONS
Bi Zaragoza-  Welcome to the [facility], [place]! It was great to visit with you and learn about your progress. Below are the goals we discussed.  Goals:  Ok to eat 4-5 small meals/day if getting full fast  Try using water bottle (does not have hand weights) for strength training video  Thanks!  Vinod Perez RD, LD  [facility] [place]

## 2024-07-01 NOTE — PROGRESS NOTES
"MEDICAL WEIGHT LOSS FOLLOW UP  Patient accompanied by:self/   Saida ID # [id number] was on speaker phone      DIAGNOSIS:  Class III Obesity    NUTRITION HISTORY:    Breakfast: hard boiled egg, cooked banana, skim milk     Lunch:  steamed chicken, salad, soup     Dinner:  mashed bay beans, sour cream      Snacks:  mid AM- orange or banana or peaches/      Beverage Choices:  48-64 oz water, 1 oranges + water,  black coffee, skim milk     Eating Behaviors: no     Dining Out: no     EXERCISE:  Type: walking/ stretching  Frequency: 3/3 days per week  Duration: 1.5 hours minutes    ADDITIONAL INFORMATION:  Patient is happy to be losing weight. Getting full faster with use of Wegovy so eating smaller portions.       ANTHROPOMETRICS:    Initial Weight: 238 pounds    Previous Weight:  230.2 pounds     Height: 4'11.02\"    Current Weight:  227.2 pounds    Weight Change: 3 pounds decrease/ overall decrease of 10.8 lb     BMI: 45.86 kg/m2    MEDICATION FOR WEIGHT LOSS:  Wegovy    EVALUATION/PROGRESS TOWARDS GOALS:  Previous Goals:  Include protein at all meals-met  Continue to work more physical activity into your day-improving      Previous Nutrition Diagnosis:  Overweight/Obese class III related to overeating and poor lifestyle habits as evidence by patient's subjective statements and  BMI of 48.07 kg/m2     Current Nutrition Diagnosis  Obese class III related to overeating and poor lifestyle habits as evidence by patient's subjective statements and  BMI of 45.86 kg/m2  No change      INTERVENTION:    Nutrition Prescription:  Recommend modified nutrient intake by decreasing energy intake    Implementation:    Meals and Snacks: 3/1    Nutrition Education (Content):  Discussed previous goals and determined new goals  Encourage physical activity  Supported patient in attempted weight loss and behavior changes   Congratulated patient on successful weight loss   Patient verbalizes understanding of weight management " by eating smaller portions.  Anticipate good compliance    Goals:  Ok to eat 4-5 small meals/day if getting full fast  Try using water bottle (does not have hand weights) for strength training video    Follow Up/Monitoring:  Other  -  patient to follow up in 8 weeks    Time Spent With Patient:  28 Minutes    Vinod Perez RD, LD  [facility]

## 2024-08-19 NOTE — PROGRESS NOTES
"2024      Return Medical Weight Management Note, phone Yakut interpretor utilized     Mila Aguirre  MRN:  [id number]  :  1955    Assessment & Plan   Problem List Items Addressed This Visit       History of MI (myocardial infarction)    Relevant Medications    tirzepatide-Weight Management (ZEPBOUND) 2.5 MG/0.5ML prefilled pen    tirzepatide-Weight Management (ZEPBOUND) 5 MG/0.5ML prefilled pen    Prediabetes    Relevant Medications    tirzepatide-Weight Management (ZEPBOUND) 2.5 MG/0.5ML prefilled pen    tirzepatide-Weight Management (ZEPBOUND) 5 MG/0.5ML prefilled pen    Primary hypertension    Relevant Medications    tirzepatide-Weight Management (ZEPBOUND) 2.5 MG/0.5ML prefilled pen    tirzepatide-Weight Management (ZEPBOUND) 5 MG/0.5ML prefilled pen    Hypertriglyceridemia    Relevant Medications    tirzepatide-Weight Management (ZEPBOUND) 2.5 MG/0.5ML prefilled pen    tirzepatide-Weight Management (ZEPBOUND) 5 MG/0.5ML prefilled pen    Osteopenia    Relevant Medications    tirzepatide-Weight Management (ZEPBOUND) 2.5 MG/0.5ML prefilled pen    tirzepatide-Weight Management (ZEPBOUND) 5 MG/0.5ML prefilled pen    Obesity, Class III, BMI 40-49.9 (morbid obesity) (H) - Primary     Patient was congratulated on wt loss success thus far. Healthy habits to assist with further weight loss were discussed. Mila will switch from Wegovy to Zepbound due to having side effects (headaches) at the 0.5 mg dose. Follow-up in 2-3 months with [facility] pharmD and myself as next available (January).           Relevant Medications    tirzepatide-Weight Management (ZEPBOUND) 2.5 MG/0.5ML prefilled pen    tirzepatide-Weight Management (ZEPBOUND) 5 MG/0.5ML prefilled pen        AOM Considerations:  Phentermine:   Avoid due to history of cardiac abnormalities/MI  CAD:                            Reports 2 heart attacks in [facility]   Palpitations:                \"Pain in left arm/chest pain from wrong " "exercises and medications and pain went away on left arm\"  HTN:                            Yes  Topiramate: Confirm aspirin dose, diuretic consideration with furosemide     GLP-1: Monitor blood sugars   Constipation:              Yes - uses orange juice to help with it           Naltrexone:      No interaction seen  Wellbutrin: Monitor response to bupropion with Plavix and nebivolol response may be increased         Metformin: Monitor blood sugars          Contrave:  Qsymia:     Referred by carla, Dr. Burkett 9/26/23, for left total knee arthroplasty - goal weight? 40 lbs weight loss needed    PATIENT INSTRUCTIONS:  Pt Instructions:  Let's switch from Wegovy to Zepbound:  Start Zepbound:  You'll start at 2.5 mg once weekly for 4 weeks.   If you're tolerating it well, increase to 5 mg once weekly thereafter until I see you again.    You can take over the counter famotidine (Pepcid) 20 mg once or twice daily as needed for nausea/heartburn.      Goals:  Continue to Focus on healthy food choices - prioritizing protein and veggies in your meals. I recommend eating every 4-6 hours to reduce the risk of low blood sugars and try to minimize snacking between meals. Stay well hydrated though the day as well.  Great job with exercising - please continue to invest in yourself with regular exercise. Continue to strive for a range for 150-300 minutes of exercise for weight maintenance and incorporating strength training twice a week to help preserve muscle!      Follow up:    Call [phone] to schedule next visit in 2 -3 months with Lauren Bloch the Medication Therapy pharmacist. Be in touch on Mychart for refills/concerns between visits    20 minutes spent on the date of the encounter doing chart review, history and exam, result review, counseling, developing plan of care, documentation, and further activities as noted      INTERVAL HISTORY:  Mila returns for medical weight management follow up.  Last seen on 4/22/24 by " "myself.  Plan at that time to start Wegovy and referred to [facility] pharmacist for check in 2 months.    Saw Lauren Bloch, [facility] pharmD and was having a very effective weight change at the 0.25 mg dose so plan at that time was to continue at the same dose -however, due to a lack of insurance coverage to stay at the lower dose she was going to increase to the 0.5 mg.    Noticing more headaches and saw Dr. Henry PCP for 2 weeks so they told her to stop it due the headaches now off of it for 2 weeks not having any more headaches.     Discussed trying zepbound instead.    Eating patterns: getting three meals in and prioritzing protein    Exercising with walking and doing PT for knee.     WEIGHT METRICS:  Body mass index is 45.65 kg/m .   Current Weight: 226 lb (102.5 kg)     Initial Weight (lbs): 238 lbs  Cumulative weight loss (lbs): 12  Weight Loss Percentage: 5.04%    Wt Readings from Last 10 Encounters:   08/20/24 226 lb (102.5 kg)   07/01/24 227 lb 3.2 oz (103.1 kg)   06/20/24 220 lb (99.8 kg)   05/29/24 230 lb 1.6 oz (104.4 kg)   04/22/24 238 lb (108 kg)   09/26/23 229 lb 8 oz (104.1 kg)   09/07/23 231 lb (104.8 kg)   07/07/23 231 lb 14.4 oz (105.2 kg)             LABS:  Reviewed in Epic    4/26/2024 BMP normal    BP Readings from Last 6 Encounters:   08/20/24 138/86   02/08/24 126/58   04/22/24 (!) 160/96       Pulse Readings from Last 6 Encounters:   08/20/24 87   04/22/24 98       PE:  /86   Pulse 87   Ht 4' 11\" (1.499 m)   Wt 226 lb (102.5 kg)   SpO2 97%   BMI 45.65 kg/m    GENERAL: Healthy, alert and no distress  EYES: Eyes grossly normal to inspection.    RESP: No audible wheeze, cough, or visible cyanosis.  No increased work of breathing.    SKIN: Visible skin clear. No significant rash, abnormal pigmentation or lesions.  NEURO: Mentation and speech appropriate for age.  PSYCH: Mentation appears normal, affect normal/bright, judgement and insight intact, normal speech and appearance " well-groomed.      Sincerely,      Jany Gray PA-C

## 2024-08-20 ENCOUNTER — TELEPHONE (OUTPATIENT)
Dept: SURGERY | Facility: CLINIC | Age: 69
End: 2024-08-20

## 2024-08-20 ENCOUNTER — OFFICE VISIT (OUTPATIENT)
Dept: SURGERY | Facility: CLINIC | Age: 69
End: 2024-08-20
Payer: COMMERCIAL

## 2024-08-20 VITALS
DIASTOLIC BLOOD PRESSURE: 86 MMHG | HEART RATE: 87 BPM | BODY MASS INDEX: 45.56 KG/M2 | WEIGHT: 226 LBS | OXYGEN SATURATION: 97 % | HEIGHT: 59 IN | SYSTOLIC BLOOD PRESSURE: 138 MMHG

## 2024-08-20 DIAGNOSIS — I10 PRIMARY HYPERTENSION: ICD-10-CM

## 2024-08-20 DIAGNOSIS — E78.1 HYPERTRIGLYCERIDEMIA: ICD-10-CM

## 2024-08-20 DIAGNOSIS — M85.80 OSTEOPENIA, UNSPECIFIED LOCATION: ICD-10-CM

## 2024-08-20 DIAGNOSIS — I25.2 HISTORY OF MI (MYOCARDIAL INFARCTION): ICD-10-CM

## 2024-08-20 DIAGNOSIS — R73.03 PREDIABETES: ICD-10-CM

## 2024-08-20 DIAGNOSIS — E66.01 OBESITY, CLASS III, BMI 40-49.9 (MORBID OBESITY) (H): Primary | ICD-10-CM

## 2024-08-20 PROCEDURE — 99213 OFFICE O/P EST LOW 20 MIN: CPT | Performed by: PHYSICIAN ASSISTANT

## 2024-08-20 PROCEDURE — T1013 SIGN LANG/ORAL INTERPRETER: HCPCS | Mod: U4 | Performed by: PHYSICIAN ASSISTANT

## 2024-08-20 NOTE — ASSESSMENT & PLAN NOTE
Patient was congratulated on wt loss success thus far. Healthy habits to assist with further weight loss were discussed. Mila will switch from Wegovy to Zepbound due to having side effects (headaches) at the 0.5 mg dose. Follow-up in 2-3 months with [facility] pharmD and myself as next available (January).

## 2024-08-20 NOTE — TELEPHONE ENCOUNTER
"Prior Authorization Retail Medication Request    Medication/Dose: tirzepatide-Weight Management (ZEPBOUND) 2.5 MG/0.5ML prefilled pen  tirzepatide-Weight Management (ZEPBOUND) 5 MG/0.5ML prefilled pen  Diagnosis and ICD code (if different than what is on RX):  E66.01] I10] [R73.03]  [E78.1]  [M85.80] [I25.2]   New/renewal/insurance change PA/secondary ins. PA:  Previously Tried and Failed:  Diets, exercise, life style changes  Rationale:  AOM Considerations:  Phentermine:   Avoid due to history of cardiac abnormalities/MI  CAD:                            Reports 2 heart attacks in [facility]   Palpitations:                \"Pain in left arm/chest pain from wrong exercises and medications and pain went away on left arm\"  HTN:                            Yes  Topiramate: Confirm aspirin dose, diuretic consideration with furosemide     GLP-1: Monitor blood sugars   Constipation:              Yes - uses orange juice to help with it           Naltrexone:      No interaction seen  Wellbutrin: Monitor response to bupropion with Plavix and nebivolol response may be increased         Metformin: Monitor blood sugars          Contrave:  Qsymia:    Insurance   Primary: BLUE PLUS [4] - BL[employer]US [employer] [place] [[id number]]   Insurance ID:  [id number]      Secondary (if applicable):PA  Insurance ID:  If applicable    Pharmacy Information (if different than what is on RX)  Name:  [facility]  Phone:  [phone]  Fax:[phone]    "

## 2024-08-20 NOTE — Clinical Note
Li,  Saw Ida today - she reports having pretty bothersome headaches at the 0.5 mg Wegovy dose and saw PCP who had her completely stop the Wegovy. I'm going to try for Zepbound then to see if it's better tolerated and asked her to see you again in 2-3 months for a med check-in.  Thanks!  - SAHARA UmañaC

## 2024-08-20 NOTE — PATIENT INSTRUCTIONS
Nice to talk with you today. Below is the plan discussed.-  Jany Gray PA-C     Pt Instructions:  Let's switch from Wegovy to Zepbound:  Start Zepbound:  You'll start at 2.5 mg once weekly for 4 weeks.   If you're tolerating it well, increase to 5 mg once weekly thereafter until I see you again.    You can take over the counter famotidine (Pepcid) 20 mg once or twice daily as needed for nausea/heartburn.      Goals:  Continue to Focus on healthy food choices - prioritizing protein and veggies in your meals. I recommend eating every 4-6 hours to reduce the risk of low blood sugars and try to minimize snacking between meals. Stay well hydrated though the day as well.  Great job with exercising - please continue to invest in yourself with regular exercise. Continue to strive for a range for 150-300 minutes of exercise for weight maintenance and incorporating strength training twice a week to help preserve muscle!      Follow up:    Call [phone] to schedule next visit in 2 -3 months with Lauren Bloch the Medication Therapy pharmacist. Be in touch on VTEXt for refills/concerns between visits    Zepbound (Tirzepatide)    Zepbound (Tirzepatide): is an injectable prescription medicine recently FDA approved for adults with obesity or overweight with an additional condition affected by their weight.      It is given as a shot once a week. It activates the body's receptors for GIP (glucose-dependent insulinotropic polypeptide) and GLP-1 (glucagon-like peptide-1) receptor, two naturally occurring hormones that help tell the brain that you are full. It also works is by slowing down how quickly food leaves your stomach.     You will start to feel aquino more quickly, notice portion changes and eat less often between meals.  Patients are advised to eat slowly and purposefully. Give yourself less portions. You may find after starting this medication you have a new point of fullness. Maintain consistent eating schedule  with meals +/- snacks and get in good hydration.    Dosing:  Initial dose: 2.5 mg injected subcutaneously once weekly for 4 weeks  Further dose changes can include: increase to 5 mg once weekly for 4 weeks, then 7.5 mg once weekly for 4 weeks, then 10 mg once weekly for 4 weeks then 12.5 mg once weekly for 4 weeks, then 15 mg (maximum dose) once weekly.    Dose changes are based on how you are tolerating the current dose, what benefits you are seeing at the current dose and if improvement in effectiveness of the drug is needed. The goal is to find the dose that works best for you with the least amount of side effects. You may find you reach this at a lower dose than the maximum dose.     Side effects: Common side effects include nausea, Heartburn,diarrhea, constipation. This is worse when starting the medication and with dose dose escalation.  It does improve with time. Stay adequately hydrated and eat small portions to decrease the risk of side effects.     The risk of pancreatitis (inflammation of the pancreas) has been associated with this type of medication, but is very rare.  If you have had pancreatitis in the past, this medication may not be for you. If you experience persistent severe abdominal pain (sometimes radiating to the back potentially accompanied by vomiting and have a fever), stop the medication and contact your provider immediately for assessment. They will do a blood test to check for pancreatitis.       Caution:   Tirzepatide (Zepbound, Mounjaro) May decrease effectiveness of your oral birth control while starting and with dose adjustments.  Use backup forms of birth control if necessary.      For any questions or concerns please send a [employer] message to our team or call our weight management call center at [phone] during regular business hours.     There is a small chance you may have some low blood sugar after taking the medication.   The signs of low blood sugar are:  Weakness  Shaky    Hungry  Sweating  Confusion      See below for ways to treat low blood sugar without adding in lots of extra calories.      Treating Low Blood Sugar    If you have symptoms of low blood sugar (sweating, shaking, dizzy, confused) eat 15 grams of carbs and wait 15 minutes:    Glucose Tabs are best for sugars under 70 -  Dex4 or BD Glucose tablets are good, you will need to take 3-4 of these to equal 15 grams.     One small box of raisins  4 oz fruit juice box or   cup fruit juice  1 small apple  1 small banana    cup canned fruit in water    English muffin or a slice of bread with jelly   1 low fat frozen waffle with sugar-free syrup    cup cottage cheese with   cup frozen or fresh blueberries  1 cup skim or low-fat milk    cup whole grain cereal  4-6 crackers such as Triscuits      This medication is usually not covered by insurance and can be quite expensive. Sometimes a prior authorization is required, which may take up to 1-2 weeks for an insurance company to make a decision if they will cover the medication. Please be patient, you will be notified after a decision has been made.    Contact the nurse via [place] or call [phone] if you have any questions or concerns. (Do not stop taking it if you don't think it's working. For some people it works without them knowing it.)     In order to get refills of this or any medication we prescribe you must be seen in the medical weight mgmt clinic every 2-4 months.    Using Your Mounjaro/Zepbound Pen  Medicine (tirzepatide)    Storing your pens  Store your pens in the refrigerator until you are ready to use them. Don't let them freeze.  Your pen may be stored at room temperature for 21 days or fewer. Just make sure the temperature doesn't get higher than 86 or lower than 46 degrees Fahrenheit.   Protect the pens from light.  Never use any pens that have .    Check your pen before use:  The liquid in the pen window should be clear or light yellow. Bubbles are  okay to see.   Do not use the pen if you can see the window contains any specks, is cloudy, or has changed color.  Make sure you have the medication and dose your health care provider prescribed.    Getting ready to inject:   1. Wash and dry your hands well.  2. Make sure the counter you use to place your supplies on is clean.  3. Make sure your injection time will not be interrupted by children or pets.  4. Have alcohol wipes or alcohol and cotton balls available to clean the injection site.   5. Choose your injection site. It can be on your stomach, back of upper arms, or upper legs. Remember to change your injection site each time you inject. Try to be at least 1 inch away from the previous one. Stay at least 1 inch away from your belly button.       Inject your dose:  1. Each pen is prefilled with one dose of medicine.    2. Pull off the grey cap at the bottom of the pen. Throw it in the trash. Do not put the cap back on the pen.   3. Make sure your pen is in the locked position. You will find the lock at the top of the pen.   4. Clean the injection site with alcohol.   5. Place the clear part of the pen against your skin. Unlock the pen by turning it to the unlock  position at the top.   6. Press and hold the purple injection button at the top of the pen. Listen for 2 clicks. The last click tells you the injection has been completed.   7. This injection is given 1 day a week. If you need to change the day you inject, there needs to be at least 3 days or 72 hours between the two doses.  8. If you miss a dose, you may take the dose within 4 days or 96 hours of the missed dose.   9. Your injection may be best tolerated if given at night.     Disposing of your pens:  Dispose of your pens in a puncture-resistant container (hard plastic bottle) or Sharps container.  Check with the county you live in on how to dispose of the container.  Do not recycle the container with used pens.       Of note, you may not be able to   your medication right away. It may require a prior authorization from your insurance company. This may take a week or more.

## 2024-08-22 NOTE — TELEPHONE ENCOUNTER
Prior Authorization Approval    Medication: TIRZEPATIDE-WEIGHT MANAGEMENT 2.5 MG/0.5ML SC SOAJ  Authorization Effective Date: 5/22/2024  Authorization Expiration Date: 8/20/2025  Approved Dose/Quantity:   Reference #:     Insurance Company: [employer] [place] - Phone [phone] Fax [phone]  Expected CoPay: $    CoPay Card Available:      Financial Assistance Needed:   Which Pharmacy is filling the prescription: [id number] - [place], [place] [address] [address] AT [facility]

## 2024-08-22 NOTE — TELEPHONE ENCOUNTER
Called patient with  services to inform her that Zepbound has been approved.    She was thankful for the call.    Sarah RAMIREZ RN

## 2024-09-04 NOTE — PROGRESS NOTES
MEDICAL WEIGHT LOSS FOLLOW UP  Patient accompanied by: self  Charlette on speaker phone      DIAGNOSIS:  Class III Obesity    NUTRITION HISTORY:    Breakfast: 2 boiled egg     Lunch:  salad-tomato, carrots, cucumber, corn with lemon, roast beef or ~ 3 oz chicken     Dinner: gunner or paypa or peach      Snacks:  no     Beverage Choices:  2-3 16.9 oz water bottles, water, black coffee,no ETOH     Eating Behaviors: denies     Dining Out: rare     EXERCISE:  Type: chair exercise/ walking  Frequency: 3/ 2 days per week  Duration: 20/30 minutes    ADDITIONAL INFORMATION:  Denies negative side effects from Zepbound, but getting full faster. Patient is wondering if she would eat more food.        ANTHROPOMETRICS:    Initial Weight: 238 pounds    Previous Weight:  227.2 pounds    Current Weight:  227.2 pounds    Weight Change: 0 pounds     BMI: 45.89 kg/m2    MEDICATION FOR WEIGHT LOSS:  Zepbound-per Epic (pt no sure of name)    EVALUATION/PROGRESS TOWARDS GOALS:  Previous Goals:  Ok to eat 4-5 small meals/day if getting full fast-not met  Try using water bottle (does not have hand weights) for strength training video-met      Previous Nutrition Diagnosis:  Obese class III related to overeating and poor lifestyle habits as evidence by patient's subjective statements and  BMI of 45.86 kg/m2     Current Nutrition Diagnosis:   Obese class III related to overeating and poor lifestyle habits as evidence by patient's subjective statements and  BMI of 45.89 kg/m2  No change      INTERVENTION:    Nutrition Prescription:  Recommend modified nutrient intake by decreasing energy intake    Implementation:    Meals and Snacks: 2/1    Nutrition Education (Content):  Discussed previous goals and determined new goals  Encourage physical activity  Supported patient in attempted weight loss and behavior changes   Congratulated patient on successful weight maintenance  Patient verbalizes understanding of weight management by  asking if she should eat more food  Anticipate good compliance    Goals:  Eat protein + 2 other food groups at meals  Eat 4-5 small meals/day  Drink Three 16.9 oz water bottles/day    Follow Up/Monitoring:  Other  -  patient to follow up in 4-6 weeks    Time Spent With Patient:  30 Minutes    Vinod Perez RD, LD  [facility]

## 2024-09-10 ENCOUNTER — ALLIED HEALTH/NURSE VISIT (OUTPATIENT)
Dept: SURGERY | Facility: CLINIC | Age: 69
End: 2024-09-10
Payer: COMMERCIAL

## 2024-09-10 VITALS — WEIGHT: 227.2 LBS | BODY MASS INDEX: 45.89 KG/M2

## 2024-09-10 DIAGNOSIS — E66.01 OBESITY, CLASS III, BMI 40-49.9 (MORBID OBESITY) (H): Primary | ICD-10-CM

## 2024-09-10 PROCEDURE — 97803 MED NUTRITION INDIV SUBSEQ: CPT

## 2024-09-10 NOTE — PATIENT INSTRUCTIONS
Bi Zaragoza-   It was great to visit with you and learn about your progress. Below are the goals we discussed.  Goals:  Eat protein + 2 other food groups at meals  Eat 4-5 small meals/day  Drink Three 16.9 oz water bottles/day  Thanks!  Vinod Perez RD, LD  [facility]

## 2024-11-14 ENCOUNTER — VIRTUAL VISIT (OUTPATIENT)
Dept: PHARMACY | Facility: CLINIC | Age: 69
End: 2024-11-14
Attending: PHYSICIAN ASSISTANT
Payer: COMMERCIAL

## 2024-11-14 VITALS — BODY MASS INDEX: 42.94 KG/M2 | HEIGHT: 59 IN | WEIGHT: 213 LBS

## 2024-11-14 DIAGNOSIS — E78.1 HYPERTRIGLYCERIDEMIA: ICD-10-CM

## 2024-11-14 DIAGNOSIS — M85.80 OSTEOPENIA, UNSPECIFIED LOCATION: ICD-10-CM

## 2024-11-14 DIAGNOSIS — I25.2 HISTORY OF MI (MYOCARDIAL INFARCTION): ICD-10-CM

## 2024-11-14 DIAGNOSIS — R60.0 LOWER EXTREMITY EDEMA: ICD-10-CM

## 2024-11-14 DIAGNOSIS — R73.03 PREDIABETES: ICD-10-CM

## 2024-11-14 DIAGNOSIS — E66.01 OBESITY, CLASS III, BMI 40-49.9 (MORBID OBESITY) (H): Primary | ICD-10-CM

## 2024-11-14 DIAGNOSIS — I10 PRIMARY HYPERTENSION: ICD-10-CM

## 2024-11-14 ASSESSMENT — PAIN SCALES - GENERAL: PAINLEVEL_OUTOF10: NO PAIN (0)

## 2024-11-14 NOTE — NURSING NOTE
Current patient location: [address] [place] [address] RD     [facility] [address]    Is the patient currently in the state of [place]? YES    Visit mode:TELEPHONE    If the visit is dropped, the patient can be reconnected by:TELEPHONE VISIT: Phone number:   Telephone Information:   Mobile [phone]       Will anyone else be joining the visit? NO  (If patient encounters technical issues they should call [phone]    Are changes needed to the allergy or medication list? No    Are refills needed on medications prescribed by this physician? NO    Rooming Documentation:  Questionnaire(s) not pre-assigned    Reason for visit: Medication Therapy Management    More OCHOA

## 2024-11-14 NOTE — PROGRESS NOTES
Medication Therapy Management (MTM) Encounter    ASSESSMENT:                            Medication Adherence/Access: No issues identified.    Weight Management:   Would benefit from continuing Zepbound at 5 mg once weekly.     Hyperlipidemia /CAD:  Concern regarding depicted fill history of patients adherence to regimen. Would benefit from pharmacist to verify with pharmacy consistent use of above medications with pharmacy.     Hypertension   /Lower Extremity Edema:    PLAN:                            Continue Zepbound 5 mg once weekly  Pharmacist to call pharmacy to verify fill history of current medications and try to assist with fill issues of amlodipine.     Follow-up: No follow-ups on file.    SUBJECTIVE/OBJECTIVE:                          Mila Aguirre is a 69 year old female seen for a follow-up visit.   #: [id number] then call was disconnected. Other  used to call back:  # [id number].     Reason for visit: Weight Management follow up.    Allergies/ADRs: Reviewed in chart  Past Medical History: Reviewed in chart  Tobacco: She reports that she has never smoked. She has never used smokeless tobacco.    Medication Adherence/Access: no issues reported.  Reports she is gets her medications filled at [place], [place].   Patient reports she is taking all of her medications prescribed every day.     Weight Management   Zepbound 5 mg once weekly Mondays     [facility], last seen by Jany Gray PA-C. At last visit with provider, transitioned from Wegovy to Zepbound due to Wegovy causing unmanageable headache. Patient reports that she never started the 2.5 mg dose and transitioned from Wegovy to Zepbound at 5 mg. Fill history shows 8/21/2024 the 2.5 mg dose filled then 9/20/2024 5 mg dose filled. Headaches resolved with transition to Zepbound.   Nutrition/Diet: Follows with dietitian at [facility]. She has been  "drinking more water, dry mouth at times. 5 bottles water/day. Used to have large portions and now eating smaller portions. Grandchildren have noticed she is eating less. Eating 3 meals + 1 snack per day. Snack: fruit.   Exercise/Activity: did not discuss today due to connectivity issues.     Initial Consult Weight: 238 lb      Current weight today: 213 lbs 0 oz  Cumulative Weight Loss: -25 lb, -10.5% from baseline    Wt Readings from Last 4 Encounters:   11/14/24 213 lb (96.6 kg)   09/10/24 227 lb 3.2 oz (103.1 kg)   08/20/24 226 lb (102.5 kg)   07/01/24 227 lb 3.2 oz (103.1 kg)     Estimated body mass index is 43.02 kg/m  as calculated from the following:    Height as of this encounter: 4' 11\" (1.499 m).    Weight as of this encounter: 213 lb (96.6 kg).    Hyperlipidemia /CAD:  Atorvastatin 10mg daily  Aspirin 81 mg daily   Clopidogrel 75 mg daily    Patient reports no significant myalgias or other side effects. MI in 2018.   Reports no current issues of chest pain/chest tightness. Verifies that she is taking all above medications every day.      Hypertension   /Lower Extremity Edema:  Irbesartran 150 mg daily   Amlodipine 10 mg daily - not taking   Furosemide 40 mg daily, now taking as needed     Patient reports no current medication side effects. Reports she has not been taking amlodipine recently as she was not able to get filled at pharmacy. Does appear that primary care provider filled medications 8/2/2024 for the year.   Patient does have blood pressure at home but hasn't checked recently .  She used to take furosemide every day but started taking more as needed.   As of now no issues of lower extremity edema.        Today's Vitals: Ht 4' 11\" (1.499 m)   Wt 213 lb (96.6 kg)   BMI 43.02 kg/m    ----------------  I spent {[facility] total time 3:729792} with this patient today. All changes were made via collaborative practice agreement with Jany Gray PA-C . A copy of the visit note was provided to the patient's " provider(s).    A summary of these recommendations was sent via [employer].    Lauren Bloch, PharmD, BCACP   Medication Therapy Management Pharmacist   [facility]    Telemedicine Visit Details  The patient's medications can be safely assessed via a telemedicine encounter.  Type of service:  Telephone visit  Originating Location (pt. Location): Home  {PROVIDER LOCATION On-site should be selected for visits conducted from your clinic location or adjoining [facility] hospital, academic office, or other nearby [facility] building. Off-site should be selected for all other provider locations, including home:[address]}  Distant Location (provider location):  Off-site  Start Time:  11:10 AM   End Time: {video/phone visit end time:889399}     Medication Therapy Recommendations  No medication therapy recommendations to display

## 2024-11-25 NOTE — PATIENT INSTRUCTIONS
"Recommendations from today's MTM visit:                                                       Continue current regimen.     Follow-up: Return in about 4 months (around 3/14/2025) for Medication Therapy Management Pharmacist Visit.    It was great speaking with you today.  I value your experience and would be very thankful for your time in providing feedback in our clinic survey. In the next few days, you may receive an email or text message from [facility] with a link to a survey related to your  clinical pharmacist.\"     To schedule another MTM appointment, please call the clinic directly or you may call the MTM scheduling line at [phone] or toll-free at [phone].     My Clinical Pharmacist's contact information:                                                      Please feel free to contact me with any questions or concerns you have.      Lauren Bloch, PharmD  Medication Therapy Management Pharmacist   [facility] [place]             "

## 2025-01-19 ENCOUNTER — HEALTH MAINTENANCE LETTER (OUTPATIENT)
Age: 70
End: 2025-01-19

## 2025-01-23 ENCOUNTER — OFFICE VISIT (OUTPATIENT)
Dept: SURGERY | Facility: CLINIC | Age: 70
End: 2025-01-23
Payer: COMMERCIAL

## 2025-01-23 VITALS — HEIGHT: 59 IN | OXYGEN SATURATION: 95 % | WEIGHT: 220.4 LBS | BODY MASS INDEX: 44.43 KG/M2 | HEART RATE: 93 BPM

## 2025-01-23 DIAGNOSIS — R73.03 PREDIABETES: ICD-10-CM

## 2025-01-23 DIAGNOSIS — K59.00 CONSTIPATION, UNSPECIFIED CONSTIPATION TYPE: ICD-10-CM

## 2025-01-23 DIAGNOSIS — I10 PRIMARY HYPERTENSION: ICD-10-CM

## 2025-01-23 DIAGNOSIS — E66.01 OBESITY, CLASS III, BMI 40-49.9 (MORBID OBESITY) (H): Primary | ICD-10-CM

## 2025-01-23 DIAGNOSIS — E78.1 HYPERTRIGLYCERIDEMIA: ICD-10-CM

## 2025-01-23 RX ORDER — POLYETHYLENE GLYCOL 3350 17 G/17G
1 POWDER, FOR SOLUTION ORAL DAILY
Qty: 238 G | Refills: 0 | Status: SHIPPED | OUTPATIENT
Start: 2025-01-23

## 2025-01-23 NOTE — PROGRESS NOTES
"2025      Return Medical Weight Management Note     Mila Aguirre  MRN:  [id number]  :  1955    Assessment & Plan   Problem List Items Addressed This Visit       Prediabetes    Relevant Medications    tirzepatide-Weight Management (ZEPBOUND) 7.5 MG/0.5ML prefilled pen    Primary hypertension    Relevant Medications    tirzepatide-Weight Management (ZEPBOUND) 7.5 MG/0.5ML prefilled pen    Hypertriglyceridemia    Relevant Medications    tirzepatide-Weight Management (ZEPBOUND) 7.5 MG/0.5ML prefilled pen    Obesity, Class III, BMI 40-49.9 (morbid obesity) (H) - Primary     Some weight increase since November - having some constipation on 5 mg Zepbound but tolerable. On discussion w/her - plan to increase to 7.5 mg Zepbound weekly and add miralax prn to help with this. Reviewed dietary recommendations and strength training.          Relevant Medications    tirzepatide-Weight Management (ZEPBOUND) 7.5 MG/0.5ML prefilled pen     Other Visit Diagnoses       Constipation, unspecified constipation type        Relevant Medications    polyethylene glycol (MIRALAX) 17 GM/Dose powder             AOM Considerations:  Phentermine:   Avoid due to history of cardiac abnormalities/MI  CAD:                            Reports 2 heart attacks in [facility]   Palpitations:                \"Pain in left arm/chest pain from wrong exercises and medications and pain went away on left arm\"  HTN:                            Yes  Topiramate: Confirm aspirin dose, diuretic consideration with furosemide     GLP-1: Monitor blood sugars   Constipation:              Yes - uses orange juice to help with it           Naltrexone:      No interaction seen  Wellbutrin: Monitor response to bupropion with Plavix and nebivolol response may be increased         Metformin: Monitor blood sugars          Contrave:  Qsymia:     Referred by Dr. Kwadwo aguirre 23, for left total knee arthroplasty - goal weight - 40 lbs weight loss " needed    PATIENT INSTRUCTIONS:  Pt Instructions:  Increase to 7.5 mg Zepbound weekly.   Recommend adding Miralax powder (1 capful) daily till stools are soft then taking every other day and slowly taper off.     Goals:  Focus on healthy food choices - prioritizing protein and veggies in your meals. I recommend eating every 4-6 hours to reduce the risk of low blood sugars and try to minimize snacking between meals. Stay well hydrated though the day as well.  Great job with exercising - please continue to invest in yourself with regular exercise. Continue to strive for a range for 150-300 minutes of exercise for weight maintenance and incorporating strength training twice-three times a week to help preserve muscle!      Follow up:    Call [phone] to schedule next visit in next available. Be in touch on Mychart for refills/concerns between visits. Please schedule with Lauren Bloch, MTM pharmD Medication Therapy Management Pharmacist. They will call you to schedule or you can call  [phone] to schedule as well.    22 minutes spent on the date of the encounter doing chart review, history and exam, result review, counseling, developing plan of care, documentation, and further activities as noted      INTERVAL HISTORY:  Mila returns for medical weight management follow up.  Last seen on 11/14/2024 with Lauren Bloch, MTM pharmD with plan to continue 5 mg Zepbound weekly.    She reports still taking 5 mg weekly. She reports losing weight but still having some knee pains. No other side effects noted. Denies heart burn, nausea, diarrhea. Some constipation. Having a BM every other day. Takes a Pinaline (sp? Pinneapple etc type of tea) with coffee and then is regular. Very hard and firm.    Weight slightly up from November - discussed increasing to 7.5 mg Zepbound.    Eating three meals day.    Yesterday:  Beans with sour cream  Coffee    chicken soup    Night: beans, cottage cheese and cheese    Exercising:  "  Walking and busy with the girls for exercise. She will look to add some arm exercises.     WEIGHT METRICS:  Body mass index is 44.52 kg/m .   Current Weight: 220 lb 6.4 oz (100 kg)  Last Visits Weight: 226 lb (102.5 kg)  Initial Weight (lbs): 238 lbs  Cumulative weight loss (lbs): 17.6  Weight Loss Percentage: 7.39%    Wt Readings from Last 10 Encounters:   01/23/25 220 lb 6.4 oz (100 kg)   11/14/24 213 lb (96.6 kg)   09/10/24 227 lb 3.2 oz (103.1 kg)   08/20/24 226 lb (102.5 kg)   07/01/24 227 lb 3.2 oz (103.1 kg)   06/20/24 220 lb (99.8 kg)   05/29/24 230 lb 1.6 oz (104.4 kg)   04/22/24 238 lb (108 kg)   09/26/23 229 lb 8 oz (104.1 kg)   09/07/23 231 lb (104.8 kg)             LABS:  Labs reviewed in Epic    4/26/2024 BMP normal    BP Readings from Last 6 Encounters:   08/20/24 138/86   02/08/24 126/58   04/22/24 (!) 160/96       Pulse Readings from Last 6 Encounters:   01/23/25 93   08/20/24 87   04/22/24 98       PE:  Pulse 93   Ht 4' 11\" (1.499 m)   Wt 220 lb 6.4 oz (100 kg)   SpO2 95%   BMI 44.52 kg/m    GENERAL: Healthy, alert and no distress  EYES: Eyes grossly normal to inspection.    RESP: No audible wheeze, cough, or visible cyanosis.  No increased work of breathing.    SKIN: Visible skin clear. No significant rash, abnormal pigmentation or lesions.  NEURO: Mentation and speech appropriate for age.  PSYCH: Mentation appears normal, affect normal/bright, judgement and insight intact, normal speech and appearance well-groomed.      Sincerely,      Jany Gray PA-C       "

## 2025-01-23 NOTE — Clinical Note
Can we call and check on her constipation and miralax use in a week or so?  Thanks!  - SAHARA UmañaC

## 2025-01-23 NOTE — Clinical Note
Update for Mila - I'm asking her to see you in 2 months. She is noting some constipation and also weight gain since November. I'm going to increase to 7.5 mg Zepbound and sent for miralax prn.  - Jany Gray, PAViralC

## 2025-01-23 NOTE — PATIENT INSTRUCTIONS
Nice to talk with you today. Below is the plan discussed.-  Jany Gray, MARIANO     Pt Instructions:  Increase to 7.5 mg Zepbound weekly.   Recommend adding Miralax powder (1 capful) daily till stools are soft then taking every other day and slowly taper off.     Goals:  Focus on healthy food choices - prioritizing protein and veggies in your meals. I recommend eating every 4-6 hours to reduce the risk of low blood sugars and try to minimize snacking between meals. Stay well hydrated though the day as well.  Great job with exercising - please continue to invest in yourself with regular exercise. Continue to strive for a range for 150-300 minutes of exercise for weight maintenance and incorporating strength training twice-three times a week to help preserve muscle!      Follow up:    Call [phone] to schedule next visit in next available. Be in touch on Mychart for refills/concerns between visits. Please schedule with Lauren Bloch, MTM pharmD Medication Therapy Management Pharmacist. They will call you to schedule or you can call  [phone] to schedule as well.

## 2025-01-23 NOTE — ASSESSMENT & PLAN NOTE
Some weight increase since November - having some constipation on 5 mg Zepbound but tolerable. On discussion w/her - plan to increase to 7.5 mg Zepbound weekly and add miralax prn to help with this. Reviewed dietary recommendations and strength training.

## 2025-06-13 ENCOUNTER — LAB REQUISITION (OUTPATIENT)
Dept: LAB | Facility: CLINIC | Age: 70
End: 2025-06-13
Payer: COMMERCIAL

## 2025-06-13 DIAGNOSIS — E55.9 VITAMIN D DEFICIENCY, UNSPECIFIED: ICD-10-CM

## 2025-06-13 LAB — VIT D+METAB SERPL-MCNC: 10 NG/ML (ref 20–50)

## 2025-06-13 PROCEDURE — 82306 VITAMIN D 25 HYDROXY: CPT | Mod: ORL

## 2025-06-17 ENCOUNTER — PATIENT OUTREACH (OUTPATIENT)
Dept: CARE COORDINATION | Facility: CLINIC | Age: 70
End: 2025-06-17
Payer: COMMERCIAL

## 2025-06-18 ENCOUNTER — LAB REQUISITION (OUTPATIENT)
Dept: LAB | Facility: CLINIC | Age: 70
End: 2025-06-18
Payer: COMMERCIAL

## 2025-06-18 DIAGNOSIS — Z12.11 ENCOUNTER FOR SCREENING FOR MALIGNANT NEOPLASM OF COLON: ICD-10-CM

## 2025-06-18 PROCEDURE — 82274 ASSAY TEST FOR BLOOD FECAL: CPT | Mod: ORL

## 2025-06-19 LAB — HEMOCCULT STL QL IA: NEGATIVE

## 2025-07-03 NOTE — PROGRESS NOTES
"2025      Return Medical Weight Management Note     Mila Aguirre  MRN:  [id number]  :  1955    Assessment & Plan   Problem List Items Addressed This Visit       History of MI (myocardial infarction)    Prediabetes    Anticipate improvement with Metformin and weight loss.         Relevant Medications    metFORMIN (GLUCOPHAGE XR) 500 MG 24 hr tablet    Primary hypertension    Hypertriglyceridemia    Class 3 severe obesity due to excess calories with serious comorbidity and body mass index (BMI) of 45.0 to 49.9 in adult (H) - Primary    Stop Zepbound. Start metformin. Discussed off-label use for weight loss, mechanism of action, titration guidelines, and common side effects. Medication handout given in AVS.  Discussed slow titration to avoid unwanted side effects.          Relevant Medications    metFORMIN (GLUCOPHAGE XR) 500 MG 24 hr tablet        AOM Considerations:  Phentermine:   Avoid due to history of cardiac abnormalities/MI  CAD:                            Reports 2 heart attacks in [facility]   Palpitations:                \"Pain in left arm/chest pain from wrong exercises and medications and pain went away on left arm\"  HTN:                            Yes  Topiramate: Confirm aspirin dose, diuretic consideration with furosemide     GLP-1: Monitor blood sugars   Constipation:Yes - uses orange juice to help with it. Currently on Zepbound, stopped due to severe headaches.          Naltrexone:      No interaction seen  Wellbutrin: Monitor response to bupropion with Plavix and nebivolol response may be increased         Metformin: Monitor blood sugars          Contrave:  Qsymia:     Referred by Dr. Kwadwo aguirre 23, for left total knee arthroplasty - goal weight - 40 lbs weight loss needed    PATIENT INSTRUCTIONS:  Stop Zepbound.  Start Metformin  mg   Directions: Take 1 tablet by mouth daily  1 week, then increase to 2 tablets daily , if tolerating can increase to 3 " tablets daily. May take with food if medication causes stomach upset. If you get side effects at a higher dose, you can stay at a lower dose.    Goals:  Great job with your walking! Strength train twice a week to preserve muscle.  See below for strength training ideas you can do at home.    Follow up:    Call [phone] to schedule next visit in October with Archana Stephens NP or Jany Gray PA-C     41 minutes spent on the date of the encounter doing chart review, history and exam, result review, counseling, developing plan of care, documentation, and further activities as noted      INTERVAL HISTORY:   used during visit.  Mila returns for medical weight management follow up.  Last seen on 1/23/25 with Jany Gray PA-C and plan at that time was to increase Zepbound to 7.5mg weekly and add Miralax PRN for constipation. Later reported that bowel movements have normalized since taking Miralax routinely.    Went to ER for chest pain 3/21/25. Hx of MI in 2018. Work up negative. Followed up with cardiology 5/2/25.    Reports she started getting a headache on Zepbound and stopped taking it. Takes injections on Mondays, then got headache 2 days later x2. She said one headache was so bad she had to go to the ER. She reports the severe headache was at the same time as the chest pain ER visit 3/21/25. Since stopping Zepbound headaches have resolved.    B: coffee w/ eggs  L: chicken stew or stuffed chicken  D: beans w/ avocado and toast  Snacks in afternoon: fruit    Hydration: coffee in AM and before bed, 1 liter water     Activity: 3-4 times/week for 1 hour. Will do body weight exercises daily.      WEIGHT METRICS:  Body mass index is 47.46 kg/m .   Current Weight: 235 lb (106.6 kg)                Wt Readings from Last 10 Encounters:   07/09/25 235 lb (106.6 kg)   01/23/25 220 lb 6.4 oz (100 kg)   11/14/24 213 lb (96.6 kg)   09/10/24 227 lb 3.2 oz (103.1 kg)   08/20/24 226 lb (102.5 kg)   07/01/24 227  "lb 3.2 oz (103.1 kg)   06/20/24 220 lb (99.8 kg)   05/29/24 230 lb 1.6 oz (104.4 kg)   04/22/24 238 lb (108 kg)   09/26/23 229 lb 8 oz (104.1 kg)               LABS:  Creatinine   Date Value Ref Range Status   04/26/2024 0.60 0.51 - 0.95 mg/dL Final     GFR Estimate   Date Value Ref Range Status   04/26/2024 >90 >60 mL/min/1.73m2 Final     Carbon Dioxide (CO2)   Date Value Ref Range Status   04/26/2024 23 22 - 29 mmol/L Final     Chloride   Date Value Ref Range Status   04/26/2024 104 98 - 107 mmol/L Final     Urea Nitrogen   Date Value Ref Range Status   04/26/2024 9.1 8.0 - 23.0 mg/dL Final     ALT   Date Value Ref Range Status   11/03/2023 24 0 - 50 U/L Final     Comment:     Reference intervals for this test were updated on 6/12/2023 to more accurately reflect our healthy population. There may be differences in the flagging of prior results with similar values performed with this method. Interpretation of those prior results can be made in the context of the updated reference intervals.       AST   Date Value Ref Range Status   11/03/2023 26 0 - 45 U/L Final     Comment:     Reference intervals for this test were updated on 6/12/2023 to more accurately reflect our healthy population. There may be differences in the flagging of prior results with similar values performed with this method. Interpretation of those prior results can be made in the context of the updated reference intervals.     Vitamin D, Total (25-Hydroxy)   Date Value Ref Range Status   06/13/2025 10 (L) 20 - 50 ng/mL Final     Comment:     mild to moderate deficiency     TSH   Date Value Ref Range Status   04/26/2024 1.79 0.30 - 4.20 uIU/mL Final        BP Readings from Last 6 Encounters:   07/09/25 (!) 151/92   08/20/24 138/86   02/08/24 126/58   04/22/24 (!) 160/96       Pulse Readings from Last 6 Encounters:   07/09/25 90   01/23/25 93   08/20/24 87   04/22/24 98       PE:  BP (!) 151/92   Pulse 90   Ht 4' 11\" (1.499 m)   Wt 235 lb (106.6 " kg)   SpO2 96%   BMI 47.46 kg/m    GENERAL: Healthy, alert and no distress  RESP: No audible wheeze, cough, or visible cyanosis.  No visible retractions or increased work of breathing.    SKIN: Visible skin clear. No significant rash, abnormal pigmentation or lesions.  PSYCH: Mentation appears normal, affect normal/bright, judgement and insight intact    Archana Stephens, NP

## 2025-07-07 ENCOUNTER — LAB REQUISITION (OUTPATIENT)
Dept: LAB | Facility: CLINIC | Age: 70
End: 2025-07-07
Payer: COMMERCIAL

## 2025-07-07 DIAGNOSIS — I10 ESSENTIAL (PRIMARY) HYPERTENSION: ICD-10-CM

## 2025-07-07 DIAGNOSIS — I25.2 OLD MYOCARDIAL INFARCTION: ICD-10-CM

## 2025-07-07 LAB
CHOLEST SERPL-MCNC: 153 MG/DL
FASTING STATUS PATIENT QL REPORTED: NO
HDLC SERPL-MCNC: 47 MG/DL
LDLC SERPL CALC-MCNC: 51 MG/DL
NONHDLC SERPL-MCNC: 106 MG/DL
TRIGL SERPL-MCNC: 276 MG/DL

## 2025-07-07 PROCEDURE — 80061 LIPID PANEL: CPT | Mod: ORL

## 2025-07-09 ENCOUNTER — OFFICE VISIT (OUTPATIENT)
Dept: SURGERY | Facility: CLINIC | Age: 70
End: 2025-07-09
Payer: COMMERCIAL

## 2025-07-09 VITALS
HEIGHT: 59 IN | DIASTOLIC BLOOD PRESSURE: 92 MMHG | BODY MASS INDEX: 47.37 KG/M2 | WEIGHT: 235 LBS | SYSTOLIC BLOOD PRESSURE: 151 MMHG | HEART RATE: 90 BPM | OXYGEN SATURATION: 96 %

## 2025-07-09 DIAGNOSIS — R73.03 PREDIABETES: ICD-10-CM

## 2025-07-09 DIAGNOSIS — I10 PRIMARY HYPERTENSION: ICD-10-CM

## 2025-07-09 DIAGNOSIS — E78.1 HYPERTRIGLYCERIDEMIA: ICD-10-CM

## 2025-07-09 DIAGNOSIS — E66.813 CLASS 3 SEVERE OBESITY DUE TO EXCESS CALORIES WITH SERIOUS COMORBIDITY AND BODY MASS INDEX (BMI) OF 45.0 TO 49.9 IN ADULT (H): Primary | ICD-10-CM

## 2025-07-09 DIAGNOSIS — I25.2 HISTORY OF MI (MYOCARDIAL INFARCTION): ICD-10-CM

## 2025-07-09 PROCEDURE — G2211 COMPLEX E/M VISIT ADD ON: HCPCS | Performed by: NURSE PRACTITIONER

## 2025-07-09 PROCEDURE — 99215 OFFICE O/P EST HI 40 MIN: CPT | Performed by: NURSE PRACTITIONER

## 2025-07-09 RX ORDER — METFORMIN HYDROCHLORIDE 500 MG/1
TABLET, EXTENDED RELEASE ORAL
Qty: 270 TABLET | Refills: 1 | Status: SHIPPED | OUTPATIENT
Start: 2025-07-09

## 2025-07-09 NOTE — ASSESSMENT & PLAN NOTE
Stop Zepbound. Start metformin. Discussed off-label use for weight loss, mechanism of action, titration guidelines, and common side effects. Medication handout given in AVS.  Discussed slow titration to avoid unwanted side effects.

## 2025-07-09 NOTE — PATIENT INSTRUCTIONS
It was nice to talk with you today! Below is the plan discussed.-  PRIYANKA Magaña      Pt Instructions:  Stop Zepbound.  Start Metformin  mg   Directions: Take 1 tablet by mouth daily  1 week, then increase to 2 tablets daily , if tolerating can increase to 3 tablets daily. May take with food if medication causes stomach upset. If you get side effects at a higher dose, you can stay at a lower dose.    Goals:  Great job with your walking! Strength train twice a week to preserve muscle.  See below for strength training ideas you can do at home.    Follow up:    Call [phone] to schedule next visit in October with Archana Stephens NP or Jany Gray PA-C       Strength Training  Strength training is exercise that involves your own body weight or equipment to build muscle, endurance, and strength. Increasing muscle helps increase your metabolism.     [employer] for Strength training  https://www.[email or web address]  Muscle Conditionin Exercises  Resistance Training with Free Weights: 3 Exercises  Resistance Training with Surgical Tubin Exercises  Seated Exercises for Arms and Legs: 11 Exercises  Stretchin Exercises    Additional Outside Sources  No Equipment Home Exercise Lists from [facility] [employer]   https://www.[employer].org/public/upload/files/general/AG75_KL_RY_MhHlui_Ebmqbsva_Fcyiewqc.pdf  Strength training YouTube workouts   [email or web address]://[email or web address]/user/[email or web address]  Guide to Beginning Strength Training  https://[email or web address].com/fitness/[email or web address]/beginners-guide-weight-training/      METFORMIN    Metformin is a medication that is used to assist with lowering blood sugars in patients that have Pre-Diabetes or Diabetes. It has also been found to help with modest weight loss.    Metformin helps to lower blood sugars by lowering the amount of sugar (glucose) your liver produces that would otherwise cause your blood sugar to increase. It also makes your body  respond better to insulin (the product that lowers your blood sugar).     Emerging research reported in journals suggests metformin may also lead to weight loss as a result of changes in the appetite centers of the brain, shifts in the gut microbiome, and reversal of metabolic changes that usually happen with age.    Starting instructions:  Take 1 tablet by mouth daily with a meal for 1 week, then increase to 2 tablets daily with a meal, if tolerating can increase to 3 tablets daily with a meal or at bedtime.     Side-effects. Metformin is generally well tolerated. The main side-effects we see are: Diarrhea, nausea and stomach upset - this tends to subside over time as your body gets used to the medication. Taking this medications with food will lower these side effects.    For any questions or concerns please send a Stand Offer message to our team or call our weight management call center at [phone].     In order to get refills of this or any medication we prescribe you must be seen in the medical weight mgmt clinic every 6 months.

## 2025-07-10 ENCOUNTER — TELEPHONE (OUTPATIENT)
Dept: PHARMACY | Facility: CLINIC | Age: 70
End: 2025-07-10
Payer: COMMERCIAL

## 2025-07-10 NOTE — TELEPHONE ENCOUNTER
Weight Management MTM Follow Up Attempt #1:    July 10, 2025    Attempted to reach patient to schedule follow up appointment with MTM Pharmacist at [facility].     Outcome: visit scheduled    Video visit scheduled for 07/16/2025 at 3:30 pm with Lauren Turner Bloch, RPH. Patient has never used video visit before. Gave patient direction's in Polish and she is going to have her daughter help her.

## 2025-07-16 ENCOUNTER — VIRTUAL VISIT (OUTPATIENT)
Dept: PHARMACY | Facility: CLINIC | Age: 70
End: 2025-07-16
Attending: PHYSICIAN ASSISTANT
Payer: COMMERCIAL

## 2025-07-16 VITALS — WEIGHT: 230 LBS | HEIGHT: 59 IN | BODY MASS INDEX: 46.37 KG/M2

## 2025-07-16 DIAGNOSIS — I25.2 HISTORY OF MI (MYOCARDIAL INFARCTION): ICD-10-CM

## 2025-07-16 DIAGNOSIS — E78.1 HYPERTRIGLYCERIDEMIA: ICD-10-CM

## 2025-07-16 DIAGNOSIS — E66.813 OBESITY, CLASS III, BMI 40-49.9 (MORBID OBESITY) (H): Primary | ICD-10-CM

## 2025-07-16 RX ORDER — ATORVASTATIN CALCIUM 40 MG/1
40 TABLET, FILM COATED ORAL DAILY
COMMUNITY
Start: 2025-05-02

## 2025-07-16 ASSESSMENT — PAIN SCALES - GENERAL: PAINLEVEL_OUTOF10: NO PAIN (0)

## 2025-07-16 NOTE — NURSING NOTE
Current patient location: [address] [place] [address] RD     [facility] [id number]    Is the patient currently in the state of [place]? YES    Visit mode: CONVERT TO TELEPHONE since pt's daughter not available [facility] and pt would prefer a call instead    If the visit is dropped, the patient can be reconnected by:TELEPHONE VISIT: Phone number:   Telephone Information:   Mobile [phone]       Will anyone else be joining the visit? NO  (If patient encounters technical issues they should call [phone]    Are changes needed to the allergy or medication list? No    Are refills needed on medications prescribed by this physician? NO    Rooming Documentation:  Questionnaire(s) not pre-assigned    Reason for visit: Medication Therapy Management    More [place]

## 2025-07-16 NOTE — PROGRESS NOTES
Medication Therapy Management (MTM) Encounter    ASSESSMENT:                            Medication Adherence/Access: No issues identified.    Hyperlipidemia/Hypertriglyceridemia/  /CAD:  Stable. Trig < 500. LDL < 70.     Weight Management:   Patient can work up to 1500 mg/day of metformin and as patient wishes to hold off on any further therapy at this time, to increase metformin and no other medication changes suggested today.    PLAN:                            Increase metformin to ER 1500 mg daily with food as planned.    Follow up with Jany Gray PA-C  and dietitian in 2 months as planned.     Follow-up: Return in about 6 months (around 1/16/2026) for Medication Therapy Management Pharmacist Visit.    SUBJECTIVE/OBJECTIVE:                          Mila Aguirre is a 70 year old female seen for a follow-up visit.  (ID# not given) was used during today's visit.   use.     Reason for visit: Weight Management follow up.    Allergies/ADRs: Reviewed in chart  Past Medical History: Reviewed in chart  Tobacco: She reports that she has never smoked. She has never used smokeless tobacco.  Alcohol: not currently using    Medication Adherence/Access: no issues reported.    Hyperlipidemia /Hypertriglyceridemia/  /CAD:  Atorvastatin 40mg daily  Aspirin 81 mg daily     Patient reports no significant myalgias or other side effects. MI in 2018. Reports no current issues of chest pain/chest tightness. Verifies that she is taking all above medications every day. Note from 5/2/2025 Cardiology consult states atorvastatin increased from 10 mg to 40 mg daily and clopidogrel stopped. No issues of bleeding or bruising. Stopped clopidogrel at direction of provider between last MTM visit.        Weight Management   Metformin ER 1000 mg daily, plan to increase to 1500 mg next week    [facility] working with [facility], last seen by Archana Stephens, CNP 7/9/2025, started  "metformin. Stopped Zepbound due to headaches and no headaches since stopping.  Reports headache was mainly night of injection and then also next morning. Would have them most days of the week. Just go up to 1000 mg daily, plans to increase to 1500 mg next week. No side effects thus far with metformin. She has noticed that her appetite has increased off Zepbound, but at times felt Zepbound caused significant appetite lowering sometimes too little.   Nutrition/Diet: Follows with dietitian. She has been drinking more water, dry mouth at times. 5 bottles water/day. Used to have large portions and now eating smaller portions. Eating 3 meals + 1 snack per day. Snack: fruit.   Exercise/Activity: walks ~1 hour per day most days of the week, walks with a friend.   Medications Tried/Failed:   Wegovy: unmanageable headache  Zepbound: unmanageable headache, constipation    Initial Consult Weight: 238 lb        Wt Readings from Last 4 Encounters:   07/16/25 230 lb (104.3 kg)   07/09/25 235 lb (106.6 kg)   01/23/25 220 lb 6.4 oz (100 kg)   11/14/24 213 lb (96.6 kg)     Estimated body mass index is 46.45 kg/m  as calculated from the following:    Height as of this encounter: 4' 11\" (1.499 m).    Weight as of this encounter: 230 lb (104.3 kg).    Today's Vitals: Ht 4' 11\" (1.499 m)   Wt 230 lb (104.3 kg)   BMI 46.45 kg/m    ----------------      I spent 15 minutes with this patient today. All changes were made via collaborative practice agreement with Jany Gray.     A summary of these recommendations available in clinic portal.     Lauren Bloch, PharmD, BCACP   Medication Therapy Management Pharmacist   [facility]    Telemedicine Visit Details  The patient's medications can be safely assessed via a telemedicine encounter.  Type of service:  Telephone visit  Originating Location (pt. Location): Home    Distant Location (provider location):  Off-site  Start Time: 3:30 PM  End Time: " 3:45 PM     Medication Therapy Recommendations  No medication therapy recommendations to display

## 2025-07-23 NOTE — PATIENT INSTRUCTIONS
"Recommendations from today's MTM visit:                                                         Increase metformin to ER 1500 mg daily with food as planned.    Follow up with Jany Gray PA-C  and dietitian in 2 months as planned.     Follow-up: Return in about 6 months (around 1/16/2026) for Medication Therapy Management Pharmacist Visit.    It was great speaking with you today.  I value your experience and would be very thankful for your time in providing feedback in our clinic survey. In the next few days, you may receive an email or text message from [facility] with a link to a survey related to your  clinical pharmacist.\"     To schedule another MTM appointment, please call the clinic directly or you may call the MTM scheduling line at [phone] or toll-free at [phone].     My Clinical Pharmacist's contact information:                                                      Please feel free to contact me with any questions or concerns you have.      Lauren Bloch, PharmD  Medication Therapy Management Pharmacist   [facility] [place]             "